# Patient Record
Sex: MALE | Race: NATIVE HAWAIIAN OR OTHER PACIFIC ISLANDER | Employment: OTHER | URBAN - METROPOLITAN AREA
[De-identification: names, ages, dates, MRNs, and addresses within clinical notes are randomized per-mention and may not be internally consistent; named-entity substitution may affect disease eponyms.]

---

## 2017-05-02 ENCOUNTER — OFFICE VISIT (OUTPATIENT)
Dept: FAMILY MEDICINE CLINIC | Age: 42
End: 2017-05-02

## 2017-05-02 VITALS
OXYGEN SATURATION: 99 % | DIASTOLIC BLOOD PRESSURE: 82 MMHG | HEART RATE: 96 BPM | SYSTOLIC BLOOD PRESSURE: 130 MMHG | BODY MASS INDEX: 29.1 KG/M2 | WEIGHT: 216 LBS

## 2017-05-02 DIAGNOSIS — I10 ESSENTIAL HYPERTENSION: Primary | ICD-10-CM

## 2017-05-02 DIAGNOSIS — E78.5 HYPERLIPIDEMIA, UNSPECIFIED HYPERLIPIDEMIA TYPE: ICD-10-CM

## 2017-05-02 DIAGNOSIS — R06.83 SNORING: ICD-10-CM

## 2017-05-02 PROCEDURE — 99213 OFFICE O/P EST LOW 20 MIN: CPT | Performed by: FAMILY MEDICINE

## 2017-05-02 RX ORDER — NABUMETONE 750 MG/1
1 TABLET, FILM COATED ORAL EVERY 12 HOURS
Refills: 5 | COMMUNITY
Start: 2017-04-15 | End: 2019-03-01

## 2017-05-02 RX ORDER — LISINOPRIL 5 MG/1
5 TABLET ORAL DAILY
Qty: 90 TABLET | Refills: 1 | Status: SHIPPED | OUTPATIENT
Start: 2017-05-02 | End: 2017-11-06 | Stop reason: SDUPTHER

## 2017-05-02 ASSESSMENT — ENCOUNTER SYMPTOMS
GASTROINTESTINAL NEGATIVE: 1
RESPIRATORY NEGATIVE: 1

## 2017-06-01 ENCOUNTER — TELEPHONE (OUTPATIENT)
Dept: FAMILY MEDICINE CLINIC | Age: 42
End: 2017-06-01

## 2017-06-07 LAB
ALBUMIN SERPL-MCNC: 4.3 G/DL
ALP BLD-CCNC: 75 U/L
ALT SERPL-CCNC: 24 U/L
ANION GAP SERPL CALCULATED.3IONS-SCNC: NORMAL MMOL/L
AST SERPL-CCNC: 15 U/L
BASOPHILS ABSOLUTE: NORMAL /ΜL
BASOPHILS RELATIVE PERCENT: NORMAL %
BILIRUB SERPL-MCNC: 0.4 MG/DL (ref 0.1–1.4)
BUN BLDV-MCNC: 12 MG/DL
CALCIUM SERPL-MCNC: 3.2 MG/DL
CHLORIDE BLD-SCNC: 101 MMOL/L
CHOLESTEROL, TOTAL: 210 MG/DL
CHOLESTEROL/HDL RATIO: 6.6
CO2: NORMAL MMOL/L
CREAT SERPL-MCNC: 1.07 MG/DL
EOSINOPHILS ABSOLUTE: NORMAL /ΜL
EOSINOPHILS RELATIVE PERCENT: NORMAL %
GFR CALCULATED: NORMAL
GLUCOSE BLD-MCNC: 95 MG/DL
HCT VFR BLD CALC: 45.83 % (ref 41–53)
HDLC SERPL-MCNC: 32 MG/DL (ref 35–70)
HEMOGLOBIN: 14.7 G/DL (ref 13.5–17.5)
LDL CHOLESTEROL CALCULATED: 140 MG/DL (ref 0–160)
LYMPHOCYTES ABSOLUTE: NORMAL /ΜL
LYMPHOCYTES RELATIVE PERCENT: NORMAL %
MCH RBC QN AUTO: NORMAL PG
MCHC RBC AUTO-ENTMCNC: NORMAL G/DL
MCV RBC AUTO: NORMAL FL
MONOCYTES ABSOLUTE: NORMAL /ΜL
MONOCYTES RELATIVE PERCENT: NORMAL %
NEUTROPHILS ABSOLUTE: NORMAL /ΜL
NEUTROPHILS RELATIVE PERCENT: NORMAL %
PDW BLD-RTO: NORMAL %
PLATELET # BLD: 228 K/ΜL
PMV BLD AUTO: NORMAL FL
POTASSIUM SERPL-SCNC: 4.2 MMOL/L
RBC # BLD: 6.56 10^6/ΜL
SODIUM BLD-SCNC: 140 MMOL/L
TOTAL PROTEIN: 7.1
TRIGL SERPL-MCNC: 189 MG/DL
VLDLC SERPL CALC-MCNC: 38 MG/DL
WBC # BLD: 6.8 10^3/ML

## 2017-06-19 ENCOUNTER — TELEPHONE (OUTPATIENT)
Dept: SLEEP MEDICINE | Age: 42
End: 2017-06-19

## 2017-07-24 ENCOUNTER — TELEPHONE (OUTPATIENT)
Dept: FAMILY MEDICINE CLINIC | Age: 42
End: 2017-07-24

## 2017-11-06 ENCOUNTER — OFFICE VISIT (OUTPATIENT)
Dept: FAMILY MEDICINE CLINIC | Age: 42
End: 2017-11-06

## 2017-11-06 VITALS
BODY MASS INDEX: 29.61 KG/M2 | DIASTOLIC BLOOD PRESSURE: 88 MMHG | WEIGHT: 219.8 LBS | OXYGEN SATURATION: 98 % | HEART RATE: 76 BPM | SYSTOLIC BLOOD PRESSURE: 132 MMHG

## 2017-11-06 DIAGNOSIS — R06.83 SNORING: ICD-10-CM

## 2017-11-06 DIAGNOSIS — I10 ESSENTIAL HYPERTENSION: Primary | ICD-10-CM

## 2017-11-06 PROCEDURE — 1036F TOBACCO NON-USER: CPT | Performed by: FAMILY MEDICINE

## 2017-11-06 PROCEDURE — G8419 CALC BMI OUT NRM PARAM NOF/U: HCPCS | Performed by: FAMILY MEDICINE

## 2017-11-06 PROCEDURE — 99213 OFFICE O/P EST LOW 20 MIN: CPT | Performed by: FAMILY MEDICINE

## 2017-11-06 PROCEDURE — G8484 FLU IMMUNIZE NO ADMIN: HCPCS | Performed by: FAMILY MEDICINE

## 2017-11-06 PROCEDURE — G8427 DOCREV CUR MEDS BY ELIG CLIN: HCPCS | Performed by: FAMILY MEDICINE

## 2017-11-06 RX ORDER — BLOOD PRESSURE TEST KIT
KIT MISCELLANEOUS
Qty: 1 KIT | Refills: 0 | Status: SHIPPED | OUTPATIENT
Start: 2017-11-06

## 2017-11-06 RX ORDER — LISINOPRIL 5 MG/1
5 TABLET ORAL DAILY
Qty: 90 TABLET | Refills: 2 | Status: SHIPPED | OUTPATIENT
Start: 2017-11-06 | End: 2018-05-07

## 2017-11-06 NOTE — PROGRESS NOTES
History   Smoking Status    Never Smoker   Smokeless Tobacco    Never Used     PMH, surgeries, SH, FH, allergies reviewed. Medication list reviewed and updated. Chief Complaint   Patient presents with    Hypertension     Hypertension:  Denies chest pain, SOB, cough, visual changes, dizziness, palpitations or headache. He is adherent to a low sodium diet. He is  adherent to an exercise program.  He is  compliant with medications. Blood pressure typically runs unknown outside of the office. Loud snoring: Yes  Witnessed apnea episodes: unsure. Review of Systems   Constitutional: Negative for chills, fever, malaise/fatigue and weight loss. HENT: Negative for sore throat. Respiratory: Negative for cough, shortness of breath and wheezing. Cardiovascular: Negative for chest pain, palpitations, orthopnea and leg swelling. Gastrointestinal: Negative for abdominal pain, constipation, diarrhea, heartburn, nausea and vomiting. Genitourinary: Negative for dysuria. Musculoskeletal: Negative for myalgias. Skin: Negative for rash. Neurological: Negative for dizziness, weakness and headaches. Psychiatric/Behavioral: The patient is not nervous/anxious and does not have insomnia. Objective:   VS reviewed    Vitals:    11/06/17 1052 11/06/17 1055 11/06/17 1118   BP: (!) 152/104 (!) 152/102 132/88   Site: Left Arm Left Arm    Position: Sitting Sitting    Cuff Size: Medium Adult Large Adult    Pulse: 76     SpO2: 98%     Weight: 219 lb 12.8 oz (99.7 kg)       Body mass index is 29.61 kg/m². Wt Readings from Last 3 Encounters:   11/06/17 219 lb 12.8 oz (99.7 kg)   05/02/17 216 lb (98 kg)   11/01/16 214 lb 3.2 oz (97.2 kg)     BP Readings from Last 3 Encounters:   11/06/17 132/88   05/02/17 130/82   11/01/16 112/70       Physical Exam   Constitutional: He is oriented to person, place, and time. No distress. Neck: No JVD present. No thyromegaly present.    Cardiovascular: Normal rate, regular rhythm, normal heart sounds and intact distal pulses. No murmur heard. Pulmonary/Chest: Effort normal and breath sounds normal. No respiratory distress. Musculoskeletal: He exhibits no edema. Neurological: He is alert and oriented to person, place, and time. Psychiatric: Mood, memory, affect and judgment normal.       Lab Results   Component Value Date     06/07/2017    K 4.2 06/07/2017     06/07/2017    CO2 26 10/28/2016    BUN 12 06/07/2017    CREATININE 1.07 06/07/2017    GLUCOSE 95 06/07/2017    CALCIUM 3.2 06/07/2017    PROT 6.9 10/28/2016    LABALBU 4.3 06/07/2017    BILITOT 0.4 06/07/2017    ALKPHOS 75 06/07/2017    AST 15 06/07/2017    ALT 24 06/07/2017    LABGLOM >60 10/28/2016    GFRAA >60 10/28/2016    AGRATIO 1.9 10/28/2016    GLOB 2.4 10/28/2016         Lab Results   Component Value Date    WBC 6.8 06/07/2017    HGB 14.7 06/07/2017    HCT 45.83 06/07/2017    MCV 71.6 (L) 10/28/2016     06/07/2017       Lab Results   Component Value Date    CHOL 210 06/06/2017    CHOL 216 (H) 10/28/2016     Lab Results   Component Value Date    TRIG 189 06/06/2017    TRIG 177 (H) 10/28/2016     Lab Results   Component Value Date    HDL 32 (A) 06/06/2017    HDL 33 (L) 10/28/2016     Lab Results   Component Value Date    LDLCALC 140 06/06/2017    LDLCALC 148 (H) 10/28/2016     Lab Results   Component Value Date    LABVLDL 35 10/28/2016    VLDL 38 06/06/2017     Lab Results   Component Value Date    CHOLHDLRATIO 6.6 06/06/2017       Lab Results   Component Value Date    LABMICR <1.20 10/28/2016       No results found for: LABA1C  No results found for: EAG      Assessment/Plan:    1. Essential hypertension  Well controlled HTN on lisinopril 5 mg daily. Lipids are acceptable no meds. Kidney function and screening for nephropathy are normal/negative. 2. Snoring  - Ambulatory referral to Sleep Medicine    Return in about 6 months (around 5/6/2018) for HTN .     Scott received counseling on the

## 2017-11-11 ASSESSMENT — ENCOUNTER SYMPTOMS
DIARRHEA: 0
WHEEZING: 0
COUGH: 0
SORE THROAT: 0
HEARTBURN: 0
ABDOMINAL PAIN: 0
ORTHOPNEA: 0
CONSTIPATION: 0
SHORTNESS OF BREATH: 0
NAUSEA: 0
VOMITING: 0

## 2018-03-16 ENCOUNTER — OFFICE VISIT (OUTPATIENT)
Dept: SLEEP MEDICINE | Age: 43
End: 2018-03-16

## 2018-03-16 VITALS
HEART RATE: 83 BPM | DIASTOLIC BLOOD PRESSURE: 90 MMHG | WEIGHT: 214 LBS | SYSTOLIC BLOOD PRESSURE: 138 MMHG | OXYGEN SATURATION: 98 % | BODY MASS INDEX: 28.99 KG/M2 | HEIGHT: 72 IN

## 2018-03-16 DIAGNOSIS — I10 ESSENTIAL HYPERTENSION: Chronic | ICD-10-CM

## 2018-03-16 DIAGNOSIS — R06.83 SNORING: ICD-10-CM

## 2018-03-16 DIAGNOSIS — G47.10 HYPERSOMNIA: Primary | ICD-10-CM

## 2018-03-16 PROCEDURE — 99244 OFF/OP CNSLTJ NEW/EST MOD 40: CPT | Performed by: INTERNAL MEDICINE

## 2018-03-16 PROCEDURE — G8419 CALC BMI OUT NRM PARAM NOF/U: HCPCS | Performed by: INTERNAL MEDICINE

## 2018-03-16 PROCEDURE — G8484 FLU IMMUNIZE NO ADMIN: HCPCS | Performed by: INTERNAL MEDICINE

## 2018-03-16 PROCEDURE — G8427 DOCREV CUR MEDS BY ELIG CLIN: HCPCS | Performed by: INTERNAL MEDICINE

## 2018-03-16 ASSESSMENT — SLEEP AND FATIGUE QUESTIONNAIRES
HOW LIKELY ARE YOU TO NOD OFF OR FALL ASLEEP WHILE SITTING INACTIVE IN A PUBLIC PLACE: 1
HOW LIKELY ARE YOU TO NOD OFF OR FALL ASLEEP WHILE SITTING AND TALKING TO SOMEONE: 0
HOW LIKELY ARE YOU TO NOD OFF OR FALL ASLEEP WHILE WATCHING TV: 2
NECK CIRCUMFERENCE (INCHES): 17.5
HOW LIKELY ARE YOU TO NOD OFF OR FALL ASLEEP WHILE SITTING AND READING: 2
HOW LIKELY ARE YOU TO NOD OFF OR FALL ASLEEP IN A CAR, WHILE STOPPED FOR A FEW MINUTES IN TRAFFIC: 1
HOW LIKELY ARE YOU TO NOD OFF OR FALL ASLEEP WHEN YOU ARE A PASSENGER IN A CAR FOR AN HOUR WITHOUT A BREAK: 1
HOW LIKELY ARE YOU TO NOD OFF OR FALL ASLEEP WHILE SITTING QUIETLY AFTER LUNCH WITHOUT ALCOHOL: 2
HOW LIKELY ARE YOU TO NOD OFF OR FALL ASLEEP WHILE LYING DOWN TO REST IN THE AFTERNOON WHEN CIRCUMSTANCES PERMIT: 1
ESS TOTAL SCORE: 10

## 2018-03-16 ASSESSMENT — ENCOUNTER SYMPTOMS
ABDOMINAL PAIN: 0
SHORTNESS OF BREATH: 0
APNEA: 1
CHOKING: 0
CHEST TIGHTNESS: 0
PHOTOPHOBIA: 0
EYE PAIN: 0
ABDOMINAL DISTENTION: 0
NAUSEA: 0
ALLERGIC/IMMUNOLOGIC NEGATIVE: 1
VOMITING: 0
RHINORRHEA: 0

## 2018-03-16 NOTE — PROGRESS NOTES
Esther Jon MD, [facility] HEART AND SURGICAL [facility]  [address]  3rd Floor, [address] [facility], [address] [phone]   [address] [place] [address] [address] Kenny Kendrick. Leroy Sawyer 37 [phone]     [address] [place] [address]    Subjective:     Patient ID: Maya Galindo is a 40 y.o. male. Chief Complaint   Patient presents with    Snoring    Fatigue       HPI:  Visit done with       Maya Galindo is a 40 y.o. male referred by Lucian Skinner MD for a sleep evaluation. He complains of snoring, snorting, periods of not breathing, tossing and turning, excessive daytime sleepiness, feels sleepy during the day but he denies knees buckling with laughing, completely or partially paralyzed while falling asleep or waking up. Symptoms began 6 years ago, gradually worsening since that time. Previous evaluation and treatment has included- none. DOT/CDL - No  FAA/'s license - No      Previous Report(s) Reviewed: historical medical records, office notes and referral letter/letters     [place] - Total score: 10    Caffeine Intake - None    Social History     Social History    Marital status:      Spouse name: N/A    Number of children: N/A    Years of education: N/A     Occupational History    Not on file. Social History Main Topics    Smoking status: Never Smoker    Smokeless tobacco: Never Used    Alcohol use No    Drug use: No    Sexual activity: Yes     Partners: Female     Other Topics Concern    Not on file     Social History Narrative    No narrative on file       Prior to Admission medications    Medication Sig Start Date End Date Taking?  Authorizing Provider   lisinopril (PRINIVIL;ZESTRIL) 5 MG tablet Take 1 tablet by mouth daily 11/6/17  Yes Lucian Skinner MD   Blood Pressure KIT Check blood pressure twice daily 11/6/17  Yes Lucian Skinner MD   nabumetone (RELAFEN)

## 2018-03-29 ENCOUNTER — HOSPITAL ENCOUNTER (OUTPATIENT)
Dept: SLEEP MEDICINE | Age: 43
Discharge: HOME | End: 2018-03-30
Attending: INTERNAL MEDICINE | Admitting: INTERNAL MEDICINE

## 2018-03-29 DIAGNOSIS — G47.10 HYPERSOMNIA: ICD-10-CM

## 2018-03-29 DIAGNOSIS — R06.83 SNORING: ICD-10-CM

## 2018-03-29 PROCEDURE — 95806 SLEEP STUDY UNATT&RESP EFFT: CPT | Performed by: INTERNAL MEDICINE

## 2018-04-04 ENCOUNTER — TELEPHONE (OUTPATIENT)
Dept: SLEEP MEDICINE | Age: 43
End: 2018-04-04

## 2018-05-07 ENCOUNTER — OFFICE VISIT (OUTPATIENT)
Dept: FAMILY MEDICINE CLINIC | Age: 43
End: 2018-05-07

## 2018-05-07 VITALS
HEART RATE: 71 BPM | OXYGEN SATURATION: 98 % | WEIGHT: 214.6 LBS | BODY MASS INDEX: 29.1 KG/M2 | DIASTOLIC BLOOD PRESSURE: 96 MMHG | SYSTOLIC BLOOD PRESSURE: 138 MMHG

## 2018-05-07 DIAGNOSIS — G47.33 OBSTRUCTIVE SLEEP APNEA SYNDROME: ICD-10-CM

## 2018-05-07 DIAGNOSIS — I10 ESSENTIAL HYPERTENSION: Primary | ICD-10-CM

## 2018-05-07 DIAGNOSIS — E78.49 OTHER HYPERLIPIDEMIA: ICD-10-CM

## 2018-05-07 PROCEDURE — 1036F TOBACCO NON-USER: CPT | Performed by: FAMILY MEDICINE

## 2018-05-07 PROCEDURE — 99213 OFFICE O/P EST LOW 20 MIN: CPT | Performed by: FAMILY MEDICINE

## 2018-05-07 PROCEDURE — G8419 CALC BMI OUT NRM PARAM NOF/U: HCPCS | Performed by: FAMILY MEDICINE

## 2018-05-07 PROCEDURE — G8427 DOCREV CUR MEDS BY ELIG CLIN: HCPCS | Performed by: FAMILY MEDICINE

## 2018-05-07 RX ORDER — ASPIRIN 325 MG
325 TABLET ORAL DAILY
COMMUNITY

## 2018-05-07 RX ORDER — LISINOPRIL 10 MG/1
10 TABLET ORAL DAILY
Qty: 90 TABLET | Refills: 0 | Status: SHIPPED | OUTPATIENT
Start: 2018-05-07 | End: 2018-06-08

## 2018-05-07 ASSESSMENT — ENCOUNTER SYMPTOMS
RESPIRATORY NEGATIVE: 1
GASTROINTESTINAL NEGATIVE: 1

## 2018-05-15 ENCOUNTER — HOSPITAL ENCOUNTER (OUTPATIENT)
Dept: GENERAL RADIOLOGY | Age: 43
Discharge: HOME | End: 2018-05-15
Attending: FAMILY MEDICINE | Admitting: FAMILY MEDICINE

## 2018-05-15 DIAGNOSIS — I10 ESSENTIAL HYPERTENSION: ICD-10-CM

## 2018-05-15 DIAGNOSIS — G47.33 OBSTRUCTIVE SLEEP APNEA SYNDROME: ICD-10-CM

## 2018-05-15 DIAGNOSIS — E78.49 OTHER HYPERLIPIDEMIA: ICD-10-CM

## 2018-05-15 LAB
A/G RATIO: 1.8 (ref 1.1–2.2)
ALBUMIN SERPL-MCNC: 4.6 G/DL (ref 3.4–5)
ALP BLD-CCNC: 77 U/L (ref 40–129)
ALT SERPL-CCNC: 29 U/L (ref 10–40)
ANION GAP SERPL CALCULATED.3IONS-SCNC: 16 MMOL/L (ref 3–16)
AST SERPL-CCNC: 15 U/L (ref 15–37)
BASOPHILS ABSOLUTE: 0 K/UL (ref 0–0.2)
BASOPHILS RELATIVE PERCENT: 0.3 %
BILIRUB SERPL-MCNC: 0.3 MG/DL (ref 0–1)
BUN BLDV-MCNC: 11 MG/DL (ref 7–20)
CALCIUM SERPL-MCNC: 9.5 MG/DL (ref 8.3–10.6)
CHLORIDE BLD-SCNC: 99 MMOL/L (ref 99–110)
CHOLESTEROL, TOTAL: 228 MG/DL (ref 0–199)
CO2: 27 MMOL/L (ref 21–32)
CREAT SERPL-MCNC: 1.1 MG/DL (ref 0.9–1.3)
CREATININE URINE: 307.7 MG/DL (ref 39–259)
EOSINOPHILS ABSOLUTE: 0.1 K/UL (ref 0–0.6)
EOSINOPHILS RELATIVE PERCENT: 1.1 %
GFR AFRICAN AMERICAN: >60
GFR NON-AFRICAN AMERICAN: >60
GLOBULIN: 2.5 G/DL
GLUCOSE BLD-MCNC: 94 MG/DL (ref 70–99)
HCT VFR BLD CALC: 45.7 % (ref 40.5–52.5)
HDLC SERPL-MCNC: 30 MG/DL (ref 40–60)
HEMOGLOBIN: 14.9 G/DL (ref 13.5–17.5)
LDL CHOLESTEROL CALCULATED: ABNORMAL MG/DL
LDL CHOLESTEROL DIRECT: 163 MG/DL
LYMPHOCYTES ABSOLUTE: 2 K/UL (ref 1–5.1)
LYMPHOCYTES RELATIVE PERCENT: 24.5 %
MCH RBC QN AUTO: 23.3 PG (ref 26–34)
MCHC RBC AUTO-ENTMCNC: 32.7 G/DL (ref 31–36)
MCV RBC AUTO: 71.2 FL (ref 80–100)
MICROALBUMIN UR-MCNC: <1.2 MG/DL
MICROALBUMIN/CREAT UR-RTO: ABNORMAL MG/G (ref 0–30)
MONOCYTES ABSOLUTE: 0.7 K/UL (ref 0–1.3)
MONOCYTES RELATIVE PERCENT: 8.4 %
NEUTROPHILS ABSOLUTE: 5.3 K/UL (ref 1.7–7.7)
NEUTROPHILS RELATIVE PERCENT: 65.7 %
PDW BLD-RTO: 14.7 % (ref 12.4–15.4)
PLATELET # BLD: 201 K/UL (ref 135–450)
PMV BLD AUTO: 9.5 FL (ref 5–10.5)
POTASSIUM SERPL-SCNC: 4.3 MMOL/L (ref 3.5–5.1)
RBC # BLD: 6.41 M/UL (ref 4.2–5.9)
SODIUM BLD-SCNC: 142 MMOL/L (ref 136–145)
TOTAL PROTEIN: 7.1 G/DL (ref 6.4–8.2)
TRIGL SERPL-MCNC: 318 MG/DL (ref 0–150)
TSH REFLEX: 1.14 UIU/ML (ref 0.27–4.2)
VLDLC SERPL CALC-MCNC: ABNORMAL MG/DL
WBC # BLD: 8.1 K/UL (ref 4–11)

## 2018-05-16 LAB
ESTIMATED AVERAGE GLUCOSE: 114 MG/DL
HBA1C MFR BLD: 5.6 %

## 2018-05-31 ENCOUNTER — HOSPITAL ENCOUNTER (OUTPATIENT)
Dept: OTHER | Age: 43
Discharge: HOME | End: 2018-05-31
Attending: FAMILY MEDICINE | Admitting: FAMILY MEDICINE

## 2018-05-31 DIAGNOSIS — I10 ESSENTIAL HYPERTENSION: ICD-10-CM

## 2018-05-31 LAB
EKG ATRIAL RATE: 76 BPM
EKG DIAGNOSIS: NORMAL
EKG P AXIS: 18 DEGREES
EKG P-R INTERVAL: 142 MS
EKG Q-T INTERVAL: 340 MS
EKG QRS DURATION: 98 MS
EKG QTC CALCULATION (BAZETT): 382 MS
EKG R AXIS: 48 DEGREES
EKG T AXIS: 42 DEGREES
EKG VENTRICULAR RATE: 76 BPM

## 2018-05-31 PROCEDURE — 93010 ELECTROCARDIOGRAM REPORT: CPT | Performed by: INTERNAL MEDICINE

## 2018-06-08 ENCOUNTER — OFFICE VISIT (OUTPATIENT)
Dept: FAMILY MEDICINE CLINIC | Age: 43
End: 2018-06-08

## 2018-06-08 VITALS
WEIGHT: 214 LBS | SYSTOLIC BLOOD PRESSURE: 150 MMHG | HEART RATE: 76 BPM | DIASTOLIC BLOOD PRESSURE: 100 MMHG | BODY MASS INDEX: 29.02 KG/M2 | OXYGEN SATURATION: 99 %

## 2018-06-08 DIAGNOSIS — I10 ESSENTIAL HYPERTENSION: Primary | ICD-10-CM

## 2018-06-08 DIAGNOSIS — E78.49 OTHER HYPERLIPIDEMIA: ICD-10-CM

## 2018-06-08 PROCEDURE — G8427 DOCREV CUR MEDS BY ELIG CLIN: HCPCS | Performed by: FAMILY MEDICINE

## 2018-06-08 PROCEDURE — G8419 CALC BMI OUT NRM PARAM NOF/U: HCPCS | Performed by: FAMILY MEDICINE

## 2018-06-08 PROCEDURE — 1036F TOBACCO NON-USER: CPT | Performed by: FAMILY MEDICINE

## 2018-06-08 PROCEDURE — 99213 OFFICE O/P EST LOW 20 MIN: CPT | Performed by: FAMILY MEDICINE

## 2018-06-08 RX ORDER — METOPROLOL SUCCINATE 50 MG/1
50 TABLET, EXTENDED RELEASE ORAL DAILY
Qty: 30 TABLET | Refills: 2 | Status: SHIPPED | OUTPATIENT
Start: 2018-06-08 | End: 2019-01-30

## 2018-06-08 ASSESSMENT — PATIENT HEALTH QUESTIONNAIRE - PHQ9
SUM OF ALL RESPONSES TO PHQ9 QUESTIONS 1 & 2: 0
1. LITTLE INTEREST OR PLEASURE IN DOING THINGS: 0
2. FEELING DOWN, DEPRESSED OR HOPELESS: 0
SUM OF ALL RESPONSES TO PHQ QUESTIONS 1-9: 0

## 2018-07-18 ENCOUNTER — OFFICE VISIT (OUTPATIENT)
Dept: PULMONOLOGY | Age: 43
End: 2018-07-18

## 2018-07-18 VITALS
OXYGEN SATURATION: 99 % | SYSTOLIC BLOOD PRESSURE: 142 MMHG | BODY MASS INDEX: 27.35 KG/M2 | WEIGHT: 220 LBS | HEIGHT: 75 IN | HEART RATE: 90 BPM | DIASTOLIC BLOOD PRESSURE: 92 MMHG

## 2018-07-18 DIAGNOSIS — G47.33 OBSTRUCTIVE SLEEP APNEA SYNDROME: ICD-10-CM

## 2018-07-18 DIAGNOSIS — I10 ESSENTIAL HYPERTENSION: ICD-10-CM

## 2018-07-18 PROCEDURE — 99213 OFFICE O/P EST LOW 20 MIN: CPT | Performed by: INTERNAL MEDICINE

## 2018-07-18 PROCEDURE — G8427 DOCREV CUR MEDS BY ELIG CLIN: HCPCS | Performed by: INTERNAL MEDICINE

## 2018-07-18 PROCEDURE — 1036F TOBACCO NON-USER: CPT | Performed by: INTERNAL MEDICINE

## 2018-07-18 PROCEDURE — G8419 CALC BMI OUT NRM PARAM NOF/U: HCPCS | Performed by: INTERNAL MEDICINE

## 2018-07-18 ASSESSMENT — SLEEP AND FATIGUE QUESTIONNAIRES
HOW LIKELY ARE YOU TO NOD OFF OR FALL ASLEEP WHILE SITTING INACTIVE IN A PUBLIC PLACE: 1
HOW LIKELY ARE YOU TO NOD OFF OR FALL ASLEEP WHILE SITTING AND TALKING TO SOMEONE: 0
HOW LIKELY ARE YOU TO NOD OFF OR FALL ASLEEP WHILE WATCHING TV: 0
HOW LIKELY ARE YOU TO NOD OFF OR FALL ASLEEP WHEN YOU ARE A PASSENGER IN A CAR FOR AN HOUR WITHOUT A BREAK: 2
HOW LIKELY ARE YOU TO NOD OFF OR FALL ASLEEP WHILE LYING DOWN TO REST IN THE AFTERNOON WHEN CIRCUMSTANCES PERMIT: 3
HOW LIKELY ARE YOU TO NOD OFF OR FALL ASLEEP WHILE SITTING AND READING: 1
HOW LIKELY ARE YOU TO NOD OFF OR FALL ASLEEP WHILE SITTING QUIETLY AFTER LUNCH WITHOUT ALCOHOL: 2
ESS TOTAL SCORE: 9
HOW LIKELY ARE YOU TO NOD OFF OR FALL ASLEEP IN A CAR, WHILE STOPPED FOR A FEW MINUTES IN TRAFFIC: 0

## 2018-07-18 ASSESSMENT — ENCOUNTER SYMPTOMS
EYE PAIN: 0
SINUS PRESSURE: 0
STRIDOR: 0
SHORTNESS OF BREATH: 0
CHEST TIGHTNESS: 0
PHOTOPHOBIA: 0

## 2018-07-18 NOTE — LETTER
[facility]  [address]   [facility]  [address]  [place] [address]  Phone: [phone]  Fax: [phone]    July 18, 2018       Patient: Horacio Newsome   MR Number: [id number]   YOB: 1975   Date of Visit: 7/18/2018       Horacio Newsome was seen for a follow up visit today. Here is my assessment and plan as well as an attached copy of his visit today:    Obstructive sleep apnea syndrome  New Problem - On Tx. Reviewed sleep study (copy given to pt for their records) and download compliance data with patient. Supplies and parts as needed for his machine. These are medically necessary. Limit caffeine use after 3pm.  Will show how to adjust humidity. 3 month f/u. If aerophagia is not better may need bilevel. Essential hypertension  Chronic- Stable. Cont meds per PCP and other physicians. If you have questions or concerns, please do not hesitate to call me. I look forward to following Scott along with you.     Sincerely,    Vianney Sheikh MD    CC providers:  Seth Clay MD  [address] [facility]

## 2018-07-18 NOTE — PROGRESS NOTES
Blood Pressure KIT Check blood pressure twice daily 1 kit 0    nabumetone (RELAFEN) 750 MG tablet Take 1 tablet by mouth every 12 hours  5    Tapentadol HCl (NUCYNTA) 75 MG TABS Take 1 tablet by mouth daily as needed      pregabalin (LYRICA) 75 MG capsule Take 75 mg by mouth nightly      diclofenac sodium (VOLTAREN) 1 % GEL Apply 2 g topically nightly       No current facility-administered medications for this visit. Allergies as of 07/18/2018    (No Known Allergies)       Patient Active Problem List   Diagnosis    Essential hypertension    Hyperlipidemia    Snoring    Obstructive sleep apnea syndrome       Past Medical History:   Diagnosis Date    Lumbar disc disorder     With chronic pain managed by pain specialist    Obstructive sleep apnea syndrome        History reviewed. No pertinent surgical history. Family History   Problem Relation Age of Onset    Diabetes Mother     Sleep Apnea Father        Review of Systems   Constitutional: Negative. HENT: Negative for dental problem, ear pain, nosebleeds, sinus pressure and sneezing. Eyes: Negative for photophobia, pain and visual disturbance. Respiratory: Negative for chest tightness, shortness of breath and stridor. Cardiovascular: Negative for chest pain, palpitations and leg swelling. Musculoskeletal: Negative for neck pain and neck stiffness. Vitals:  Weight BMI   Wt Readings from Last 3 Encounters:   07/18/18 220 lb (99.8 kg)   06/08/18 214 lb (97.1 kg)   05/07/18 214 lb 9.6 oz (97.3 kg)    Body mass index is 27.5 kg/m². BP HR SaO2   BP Readings from Last 3 Encounters:   07/18/18 (!) 142/92   06/08/18 (!) 150/100   05/07/18 (!) 138/96    Pulse Readings from Last 3 Encounters:   07/18/18 90   06/08/18 76   05/07/18 71    SpO2 Readings from Last 3 Encounters:   07/18/18 99%   06/08/18 99%   05/07/18 98%          Assessment/Plan:     Obstructive sleep apnea syndrome  New Problem - On Tx.   Reviewed sleep study (copy given to pt for their records) and download compliance data with patient. Supplies and parts as needed for his machine. These are medically necessary. Limit caffeine use after 3pm.  Will show how to adjust humidity. 3 month f/u. If aerophagia is not better may need bilevel. Essential hypertension  Chronic- Stable. Cont meds per PCP and other physicians. Diagnoses of Obstructive sleep apnea syndrome and Essential hypertension were pertinent to this visit. The chronic medical conditions listed are directly related to the primary diagnosis listed above. The management of the primary diagnosis affects the secondary diagnosis and vice versa.       Jocelyne Rothman MD, Lilia Velázquez, [facility]  Medical Director  [place] and [address] [place] [address]

## 2018-07-18 NOTE — ASSESSMENT & PLAN NOTE
New Problem - On Tx. Reviewed sleep study (copy given to pt for their records) and download compliance data with patient. Supplies and parts as needed for his machine. These are medically necessary. Limit caffeine use after 3pm.  Will show how to adjust humidity. 3 month f/u. If aerophagia is not better may need bilevel.

## 2018-07-23 ENCOUNTER — OFFICE VISIT (OUTPATIENT)
Dept: FAMILY MEDICINE CLINIC | Age: 43
End: 2018-07-23

## 2018-07-23 VITALS
DIASTOLIC BLOOD PRESSURE: 98 MMHG | SYSTOLIC BLOOD PRESSURE: 130 MMHG | WEIGHT: 218.8 LBS | BODY MASS INDEX: 27.35 KG/M2 | OXYGEN SATURATION: 98 % | HEART RATE: 84 BPM

## 2018-07-23 DIAGNOSIS — I10 ESSENTIAL HYPERTENSION: Primary | ICD-10-CM

## 2018-07-23 PROCEDURE — 99214 OFFICE O/P EST MOD 30 MIN: CPT | Performed by: FAMILY MEDICINE

## 2018-07-23 PROCEDURE — 1036F TOBACCO NON-USER: CPT | Performed by: FAMILY MEDICINE

## 2018-07-23 PROCEDURE — 93000 ELECTROCARDIOGRAM COMPLETE: CPT | Performed by: FAMILY MEDICINE

## 2018-07-23 PROCEDURE — G8419 CALC BMI OUT NRM PARAM NOF/U: HCPCS | Performed by: FAMILY MEDICINE

## 2018-07-23 PROCEDURE — G8427 DOCREV CUR MEDS BY ELIG CLIN: HCPCS | Performed by: FAMILY MEDICINE

## 2018-07-23 RX ORDER — METOPROLOL SUCCINATE 100 MG/1
100 TABLET, EXTENDED RELEASE ORAL DAILY
Qty: 30 TABLET | Refills: 3 | Status: SHIPPED | OUTPATIENT
Start: 2018-07-23 | End: 2018-08-20 | Stop reason: SDUPTHER

## 2018-07-23 NOTE — PATIENT INSTRUCTIONS
citrato de sodio y el bicarbonato de sodio. La comida asiática frecuentemente contiene MSG añadido. Si sale a comer, a veces puede pedir que no le pongan MSG ni sal a kenan alimentos. Compre alimentos bajos en sodio  · Compre alimentos que indiquen en la etiqueta \"sin sal\" (\"unsalted\") (sin sal añadida), \"sin sodio\" (\"sodium-free\") (menos de 5 mg de sodio por porción) o \"bajo en sodio\" (\"low-sodium\") (menos de 140 mg de sodio por porción). Los alimentos que indiquen en la etiqueta \"reducido en sodio\" (\"reduced-sodium\") y \"ligero contenido de sodio\" (\"light sodium\") aún pueden contener demasiado sodio. Asegúrese de leer la etiqueta para verificar cuánto sodio está consumiendo. · Compre verduras frescas o congeladas que no tengan salsas [id number]. Compre las versiones de bajo sodio de verduras Kirkjubæjuanrkimberlyausdoraur, sopas y otros productos enlatados. Prepare comidas bajas en sodio  · Reduzca la cantidad de sal que [place] para cocinar. Mason le ayudará a acostumbrarse al [employer]. No añada richard después de eli cocinado. Griselda cucharadita de sal contiene alrededor de 2,300 mg de sodio. · Retire el salero de la obando. · Sazone kenan comidas con ajo, jugo de rashaad, cebolla, vinagre, hierbas y especias. No use salsa de soya, salsa de soya \"lite\", salsa para kevin, sal de cebolla, sal de ajo o de apio, mostaza ni ketchup (salsa de tomate) en kenan comidas. · Use aderezos para ensaladas, salsas y ketchup de bajo contenido de [place]. O prepare kenan propios aderezos para ensaladas y salsas sin añadir sal.  · Use menos sal (o nada) cuando la receta lo pida. Por lo general puede añadir la mitad de la cantidad de richard que pide la receta sin que esta pierda el sabor. Otros alimentos perri el arroz, las pastas y los cereales no necesitan sal adicional.  · Enjuague las verduras enlatadas y cocínelas en agua fresca. Mason le delmis algo de sal, yan no toda.   · Evite el agua que naturalmente tenga un alto contenido de sodio o que haya sido tratada con ablandadores, los cuales [employer]. Llame a beavers compañía de agua local para averiguar cuál es el contenido de sodio del agua. Si compra agua embotellada, marcelo la etiqueta y escoja radha alirio sin sodio. Evite los alimentos altos en sodio  · Evite comer:  ¨ Nicholas, pescados y aves [place], curados, salados y [place]. ¨ Jamón, tocino, perros calientes (\"hot dogs\") y nicholas frías. ¨ Queso común, azalea y procesado y mantequilla de cacahuate (maní) común. ¨ Galletas saladas y otros refrigerios salados perri \"pretzels\", \"chips\" (perri linsey fritas) y palomitas de maíz saladas. ¨ Comidas preparadas y congeladas, a menos que tengan radha etiqueta que diga \"bajo en sodio\" (\"low-sodium\"). ¨ Sopas, caldos y consomés enlatados y secos o deshidratados, a menos que digan \"sin sodio\" (\"sodium-free\") o \"bajo en sodio\" (\"low-sodium\"). ¨ Verduras enlatadas, a menos que digan \"sin sodio\" (\"sodium-free\") o \"bajo en sodio\" (\"low-sodium\"). ¨ Linsey fritas (a la francesa), pizzas, tacos y otras comidas rápidas. ¨ Pepinillos, aceitunas, ketchup y otros condimentos, en especial la salsa de soya, a menos que digan \"sin sodio\" (\"sodium-free\") o \"bajo en sodio\" (\"low-sodium\"). ¿Dónde puede encontrar más información en inglés? Noemi garcia [email or web address] e ingrese a beavers cuenta de MyChart. Katie Mike J439 en el cuadro \"Search Health Information\" para más información (en inglés) sobre \"Dieta baja en sodio (2,000 miligramos): Instrucciones de cuidado - [ Low Sodium Diet (2,000 Milligram): Care Instructions ]. \"     Si no tiene radha cuenta, mick saritha en el enlace \"Sign Up Now\". Revisado: 17 [place], 56  Versión del contenido: 11.6  © 2957-3713 [employer], Play[employer]. Las instrucciones de cuidado fueron adaptadas bajo licencia por [facility] ([facility]). Si usted tiene [place] afección médica o sobre estas instrucciones, siempre pregunte a beavers profesional de sixto.  Arnie Becerril seguimiento es radha parte clave de beavers tratamiento y seguridad. Asegúrese de hacer y acudir a todas las citas, y llame a beavers médico si está teniendo problemas. También es radha buena idea saber los resultados de kenan exámenes y mantener radha lista de los medicamentos que gary. ¿Dónde puede encontrar más información en inglés? Pamela Net a [email or web address] e ingrese a beavers cuenta de MyChart. Vic Olmedo P501 en el Arlyss Yahaira \"Search Health Information\" para más información (en inglés) sobre \"Aprenda sobre la presión arterial amandeep - [ Learning About High Blood Pressure ]. \"     Si no tiene radha cuenta, mick saritha en el enlace \"Sign Up Now\". Revisado: 10 mayo, 2017  Versión del contenido: 11.6  © 7889-1971 Healthwise, Incorporated. Las instrucciones de cuidado fueron adaptadas bajo licencia por [facility] ([facility]). Si usted tiene [place] afección médica o sobre estas instrucciones, siempre pregunte a beavers profesional de sixto. Healthwise, Incorporated niega toda garantía o responsabilidad por beavers uso de esta información.

## 2018-08-20 ENCOUNTER — TELEPHONE (OUTPATIENT)
Dept: SLEEP MEDICINE | Age: 43
End: 2018-08-20

## 2018-08-20 ENCOUNTER — OFFICE VISIT (OUTPATIENT)
Dept: FAMILY MEDICINE CLINIC | Age: 43
End: 2018-08-20

## 2018-08-20 VITALS
HEART RATE: 85 BPM | WEIGHT: 219 LBS | OXYGEN SATURATION: 100 % | BODY MASS INDEX: 27.37 KG/M2 | DIASTOLIC BLOOD PRESSURE: 86 MMHG | SYSTOLIC BLOOD PRESSURE: 136 MMHG

## 2018-08-20 DIAGNOSIS — I10 ESSENTIAL HYPERTENSION: Primary | ICD-10-CM

## 2018-08-20 PROCEDURE — 99213 OFFICE O/P EST LOW 20 MIN: CPT | Performed by: FAMILY MEDICINE

## 2018-08-20 PROCEDURE — 1036F TOBACCO NON-USER: CPT | Performed by: FAMILY MEDICINE

## 2018-08-20 PROCEDURE — G8427 DOCREV CUR MEDS BY ELIG CLIN: HCPCS | Performed by: FAMILY MEDICINE

## 2018-08-20 PROCEDURE — G8419 CALC BMI OUT NRM PARAM NOF/U: HCPCS | Performed by: FAMILY MEDICINE

## 2018-08-20 RX ORDER — METOPROLOL SUCCINATE 100 MG/1
100 TABLET, EXTENDED RELEASE ORAL DAILY
Qty: 90 TABLET | Refills: 0 | Status: SHIPPED | OUTPATIENT
Start: 2018-08-20 | End: 2019-01-30 | Stop reason: ALTCHOICE

## 2018-08-20 NOTE — TELEPHONE ENCOUNTER
Spouse Amandeep Arthur called today. Said they received a letter from [facility] Patient [place] [facility], [phone]. The letter informed them that the insurance company (said the letter did not specify which insurance company [employer] or [employer]) needs a face to face with the doctor and also appointment information for office visits after 05/24. The spouse can be reached @ [phone].

## 2019-01-30 ENCOUNTER — OFFICE VISIT (OUTPATIENT)
Dept: FAMILY MEDICINE CLINIC | Age: 44
End: 2019-01-30
Payer: COMMERCIAL

## 2019-01-30 VITALS — DIASTOLIC BLOOD PRESSURE: 100 MMHG | SYSTOLIC BLOOD PRESSURE: 160 MMHG | WEIGHT: 212 LBS | BODY MASS INDEX: 26.5 KG/M2

## 2019-01-30 DIAGNOSIS — I10 ESSENTIAL HYPERTENSION: Primary | ICD-10-CM

## 2019-01-30 DIAGNOSIS — R94.31 ABNORMAL EKG: ICD-10-CM

## 2019-01-30 PROCEDURE — G8484 FLU IMMUNIZE NO ADMIN: HCPCS | Performed by: FAMILY MEDICINE

## 2019-01-30 PROCEDURE — 1036F TOBACCO NON-USER: CPT | Performed by: FAMILY MEDICINE

## 2019-01-30 PROCEDURE — G8419 CALC BMI OUT NRM PARAM NOF/U: HCPCS | Performed by: FAMILY MEDICINE

## 2019-01-30 PROCEDURE — G8427 DOCREV CUR MEDS BY ELIG CLIN: HCPCS | Performed by: FAMILY MEDICINE

## 2019-01-30 PROCEDURE — 99213 OFFICE O/P EST LOW 20 MIN: CPT | Performed by: FAMILY MEDICINE

## 2019-01-30 RX ORDER — AMLODIPINE BESYLATE 5 MG/1
5 TABLET ORAL DAILY
Qty: 30 TABLET | Refills: 0 | Status: SHIPPED | OUTPATIENT
Start: 2019-01-30 | End: 2019-02-26 | Stop reason: SDUPTHER

## 2019-01-30 RX ORDER — METOPROLOL SUCCINATE 50 MG/1
50 TABLET, EXTENDED RELEASE ORAL DAILY
Qty: 30 TABLET | Refills: 0 | Status: SHIPPED | OUTPATIENT
Start: 2019-01-30 | End: 2019-02-26 | Stop reason: SDUPTHER

## 2019-02-19 ENCOUNTER — HOSPITAL ENCOUNTER (OUTPATIENT)
Dept: NON INVASIVE DIAGNOSTICS | Age: 44
Discharge: HOME | End: 2019-02-19
Payer: COMMERCIAL

## 2019-02-19 DIAGNOSIS — R94.31 ABNORMAL EKG: ICD-10-CM

## 2019-02-19 DIAGNOSIS — I10 ESSENTIAL HYPERTENSION: ICD-10-CM

## 2019-02-19 LAB
LEFT VENTRICULAR EJECTION FRACTION HIGH VALUE: 55 %
LEFT VENTRICULAR EJECTION FRACTION MODE: NORMAL
LV EF: 50 %
LV EF: 53 %
LVEF MODALITY: NORMAL

## 2019-02-19 PROCEDURE — 93306 TTE W/DOPPLER COMPLETE: CPT

## 2019-02-25 ENCOUNTER — OFFICE VISIT (OUTPATIENT)
Dept: CARDIOLOGY CLINIC | Age: 44
End: 2019-02-25
Payer: COMMERCIAL

## 2019-02-25 VITALS
HEART RATE: 81 BPM | WEIGHT: 219.9 LBS | SYSTOLIC BLOOD PRESSURE: 130 MMHG | DIASTOLIC BLOOD PRESSURE: 80 MMHG | HEIGHT: 75 IN | BODY MASS INDEX: 27.34 KG/M2

## 2019-02-25 DIAGNOSIS — I10 ESSENTIAL HYPERTENSION: Primary | ICD-10-CM

## 2019-02-25 DIAGNOSIS — E78.2 MIXED HYPERLIPIDEMIA: ICD-10-CM

## 2019-02-25 PROCEDURE — 93000 ELECTROCARDIOGRAM COMPLETE: CPT | Performed by: INTERNAL MEDICINE

## 2019-02-25 PROCEDURE — G8419 CALC BMI OUT NRM PARAM NOF/U: HCPCS | Performed by: INTERNAL MEDICINE

## 2019-02-25 PROCEDURE — 99214 OFFICE O/P EST MOD 30 MIN: CPT | Performed by: INTERNAL MEDICINE

## 2019-02-25 PROCEDURE — G8427 DOCREV CUR MEDS BY ELIG CLIN: HCPCS | Performed by: INTERNAL MEDICINE

## 2019-02-25 PROCEDURE — G8484 FLU IMMUNIZE NO ADMIN: HCPCS | Performed by: INTERNAL MEDICINE

## 2019-02-25 PROCEDURE — 1036F TOBACCO NON-USER: CPT | Performed by: INTERNAL MEDICINE

## 2019-02-26 DIAGNOSIS — I10 ESSENTIAL HYPERTENSION: ICD-10-CM

## 2019-02-26 DIAGNOSIS — R94.31 ABNORMAL EKG: ICD-10-CM

## 2019-02-27 RX ORDER — AMLODIPINE BESYLATE 5 MG/1
5 TABLET ORAL DAILY
Qty: 30 TABLET | Refills: 2 | Status: SHIPPED | OUTPATIENT
Start: 2019-02-27 | End: 2019-04-02 | Stop reason: SDUPTHER

## 2019-02-27 RX ORDER — METOPROLOL SUCCINATE 50 MG/1
50 TABLET, EXTENDED RELEASE ORAL DAILY
Qty: 30 TABLET | Refills: 2 | Status: SHIPPED | OUTPATIENT
Start: 2019-02-27 | End: 2019-05-08 | Stop reason: SDUPTHER

## 2019-03-01 ENCOUNTER — OFFICE VISIT (OUTPATIENT)
Dept: FAMILY MEDICINE CLINIC | Age: 44
End: 2019-03-01
Payer: COMMERCIAL

## 2019-03-01 VITALS
HEART RATE: 88 BPM | DIASTOLIC BLOOD PRESSURE: 90 MMHG | OXYGEN SATURATION: 99 % | SYSTOLIC BLOOD PRESSURE: 120 MMHG | WEIGHT: 221 LBS | BODY MASS INDEX: 27.62 KG/M2

## 2019-03-01 DIAGNOSIS — I10 ESSENTIAL HYPERTENSION: Primary | ICD-10-CM

## 2019-03-01 PROCEDURE — 99213 OFFICE O/P EST LOW 20 MIN: CPT | Performed by: FAMILY MEDICINE

## 2019-03-01 PROCEDURE — G8419 CALC BMI OUT NRM PARAM NOF/U: HCPCS | Performed by: FAMILY MEDICINE

## 2019-03-01 PROCEDURE — 1036F TOBACCO NON-USER: CPT | Performed by: FAMILY MEDICINE

## 2019-03-01 PROCEDURE — G8427 DOCREV CUR MEDS BY ELIG CLIN: HCPCS | Performed by: FAMILY MEDICINE

## 2019-03-01 PROCEDURE — G8484 FLU IMMUNIZE NO ADMIN: HCPCS | Performed by: FAMILY MEDICINE

## 2019-03-28 DIAGNOSIS — E78.2 MIXED HYPERLIPIDEMIA: ICD-10-CM

## 2019-03-28 LAB
CHOLESTEROL, TOTAL: 202 MG/DL (ref 0–199)
HDLC SERPL-MCNC: 33 MG/DL (ref 40–60)
LDL CHOLESTEROL CALCULATED: 119 MG/DL
TRIGL SERPL-MCNC: 251 MG/DL (ref 0–150)
VLDLC SERPL CALC-MCNC: 50 MG/DL

## 2019-04-02 ENCOUNTER — OFFICE VISIT (OUTPATIENT)
Dept: CARDIOLOGY CLINIC | Age: 44
End: 2019-04-02
Payer: COMMERCIAL

## 2019-04-02 VITALS
HEART RATE: 76 BPM | BODY MASS INDEX: 27.37 KG/M2 | OXYGEN SATURATION: 96 % | DIASTOLIC BLOOD PRESSURE: 90 MMHG | HEIGHT: 75 IN | SYSTOLIC BLOOD PRESSURE: 148 MMHG | WEIGHT: 220.1 LBS

## 2019-04-02 DIAGNOSIS — G47.33 OBSTRUCTIVE SLEEP APNEA SYNDROME: ICD-10-CM

## 2019-04-02 DIAGNOSIS — R94.31 ABNORMAL EKG: ICD-10-CM

## 2019-04-02 DIAGNOSIS — E78.49 OTHER HYPERLIPIDEMIA: Primary | ICD-10-CM

## 2019-04-02 DIAGNOSIS — I10 ESSENTIAL HYPERTENSION: ICD-10-CM

## 2019-04-02 PROCEDURE — 99214 OFFICE O/P EST MOD 30 MIN: CPT | Performed by: NURSE PRACTITIONER

## 2019-04-02 PROCEDURE — G8427 DOCREV CUR MEDS BY ELIG CLIN: HCPCS | Performed by: NURSE PRACTITIONER

## 2019-04-02 PROCEDURE — 1036F TOBACCO NON-USER: CPT | Performed by: NURSE PRACTITIONER

## 2019-04-02 PROCEDURE — G8419 CALC BMI OUT NRM PARAM NOF/U: HCPCS | Performed by: NURSE PRACTITIONER

## 2019-04-02 RX ORDER — AMLODIPINE BESYLATE 10 MG/1
10 TABLET ORAL DAILY
Qty: 30 TABLET | Refills: 3 | Status: SHIPPED | OUTPATIENT
Start: 2019-04-02 | End: 2019-05-08 | Stop reason: SDUPTHER

## 2019-04-02 NOTE — PROGRESS NOTES
[address]     Outpatient Follow Up Note    CHIEF COMPLAINT / HPI: Follow Up secondary to hypertension, hyperlipidemia, and OFE         Candice Perez is 44 y.o. male who presents today for a routine follow up  related to the above mentioned issues. Subjective:   Since the time of last office visit, the patient admits their symptoms have not changed. 45 y/o male seen for HTN and \"abnormal EKG\". He does not know about the abnormal EKG and EKG on 2/25/19 was normal.         Today patient stated he is feeling well. Today's B/P- 148/90. He denies any chest pain, palpitations, SOB, dizziness, or edema. With regard to medication therapy the patient has been compliant with prescribed regimen. They have tolerated therapy to date. Past Medical History:   Diagnosis Date    Hypertension     Lumbar disc disorder     With chronic pain managed by pain specialist    Obstructive sleep apnea syndrome      Social History:    Social History     Tobacco Use   Smoking Status Never Smoker   Smokeless Tobacco Never Used     Current Medications:  Current Outpatient Medications   Medication Sig Dispense Refill    amLODIPine (NORVASC) 10 MG tablet Take 1 tablet by mouth daily 30 tablet 3    metoprolol succinate (TOPROL XL) 50 MG extended release tablet Take 1 tablet by mouth daily 30 tablet 2    pregabalin (LYRICA) 100 MG capsule Take 100 mg by mouth 2 times daily. Luz Marina Monas aspirin 325 MG tablet Take 325 mg by mouth daily      Blood Pressure KIT Check blood pressure twice daily 1 kit 0     No current facility-administered medications for this visit. REVIEW OF SYSTEMS:   CONSTITUTIONAL: No major weight gain or loss, fatigue, weakness, night sweats or fever. There's been no change in energy level, sleep pattern, or activity level. HEENT: No new vision difficulties or ringing in the ears. RESPIRATORY: No new SOB, PND, orthopnea or cough.    CARDIOVASCULAR: See HPI  GI: No nausea, vomiting, diarrhea, constipation, abdominal pain or changes in bowel habits. : No urinary frequency, urgency, incontinence hematuria or dysuria. SKIN: No cyanosis or skin lesions. MUSCULOSKELETAL: No new muscle or joint pain. NEUROLOGICAL: No syncope or TIA-like symptoms. PSYCHIATRIC: No anxiety, pain, insomnia or depression    Objective:   PHYSICAL EXAM:        VITALS:  BP (!) 148/90 (Site: Right Upper Arm, Position: Sitting, Cuff Size: Large Adult)   Pulse 76   Ht 6' 3\" (1.905 m)   Wt 220 lb 1.6 oz (99.8 kg)   SpO2 96%   BMI 27.51 kg/m²     CONSTITUTIONAL: Cooperative, no apparent distress, and appears well nourished / developed  NEUROLOGIC:  Awake and orientated to person, place and time. PSYCH: Calm affect. SKIN: Warm and dry. HEENT: Sclera non-icteric, normocephalic, neck supple, no elevation of JVP, normal carotid pulses with no bruits and thyroid normal size. LUNGS:  No increased work of breathing and clear to auscultation, no crackles or wheezing. CARDIOVASCULAR:  Regular rate and rhythm with no murmurs, gallops, rubs, or abnormal heart sounds, normal PMI. The apical impulses not displaced. Heart tones are crisp and normal                                                                                          Cervical veins are not engorged                 JVP less than 8 cm H2O                                                                              The carotid upstroke is normal in amplitude and contour without delay or bruit    ABDOMEN:  Normal bowel sounds, non-distended and non-tender to palpation   EXT: No edema, no calf tenderness. Pulses are present bilaterally.     DATA:    Lab Results   Component Value Date    ALT 36 09/13/2018    AST 19 09/13/2018    ALKPHOS 87 09/13/2018    BILITOT 0.3 09/13/2018     Lab Results   Component Value Date    CREATININE 1.0 09/13/2018    BUN 11 09/13/2018     09/13/2018    K 4.1 09/13/2018     09/13/2018    CO2 24 09/13/2018     No results found for: TSH, C7DAVPW, D6UIDBK, THYROIDAB  Lab Results   Component Value Date    WBC 9.6 09/13/2018    HGB 15.3 09/13/2018    HCT 47.1 09/13/2018    MCV 72.6 (L) 09/13/2018     09/13/2018     No components found for: CHLPL  Lab Results   Component Value Date    TRIG 251 (H) 03/28/2019    TRIG 318 (H) 05/15/2018    TRIG 189 06/06/2017     Lab Results   Component Value Date    HDL 33 (L) 03/28/2019    HDL 30 (L) 05/15/2018    HDL 32 (A) 06/06/2017     Lab Results   Component Value Date    LDLCALC 119 (H) 03/28/2019    LDLCALC see below 05/15/2018    LDLCALC 140 06/06/2017     Lab Results   Component Value Date    LABVLDL 50 03/28/2019    LABVLDL see below 05/15/2018    LABVLDL 35 10/28/2016     Radiology Review:  Pertinent images / reports were reviewed as a part of this visit and reveals the following:    Last ECHO: 2/19/19  Summary   -Low normal global systolic function with an ejection fraction estimated at   50-55%.  -No regional wall motion abnormalities noted.   -Mild mitral regurgitation.   -Normal diastolic function. Avg. E/e'=12.7       Assessment:      Diagnosis Orders   1. Other hyperlipidemia     2. Essential hypertension  amLODIPine (NORVASC) 10 MG tablet   3. Abnormal EKG     4. Obstructive sleep apnea syndrome         Patient  is stable since last OV. Plan:   Increase Norvasc to 10 mg daily- discuss side effects- edema, and patient stated he only takes Lyrica as needed. Follow up in three weeks for a b/P check    I have addressed the patient's cardiac risk factors and adjusted pharmacologic treatment as needed. In addition, I have reinforced the need for patient directed risk factor modification. Further evaluation will be based upon the patient's clinical course and testing results. All questions and concerns were addressed to the patient/family. Alternatives to  treatment were discussed. The patient  currently  is not smoking.  The risks related to smoking were reviewed with the patient. Recommend maintaining a smoke-free lifestyle. Products available for smoking cessation were discussed. Daily weight, low sodium diet were discussed. Patient instructed to call the office with a weight gain: > 3 lbs over night or 5 lbs in one week; swelling, SOB/orthopnea/PND      Thank you for allowing to us to participate in the care of [address] [place] [address].       [facility]

## 2019-05-08 ENCOUNTER — OFFICE VISIT (OUTPATIENT)
Dept: FAMILY MEDICINE CLINIC | Age: 44
End: 2019-05-08
Payer: COMMERCIAL

## 2019-05-08 VITALS
HEART RATE: 82 BPM | WEIGHT: 222.2 LBS | SYSTOLIC BLOOD PRESSURE: 142 MMHG | DIASTOLIC BLOOD PRESSURE: 92 MMHG | BODY MASS INDEX: 27.77 KG/M2 | OXYGEN SATURATION: 99 %

## 2019-05-08 DIAGNOSIS — I10 ESSENTIAL HYPERTENSION: ICD-10-CM

## 2019-05-08 PROCEDURE — 1036F TOBACCO NON-USER: CPT | Performed by: FAMILY MEDICINE

## 2019-05-08 PROCEDURE — 99213 OFFICE O/P EST LOW 20 MIN: CPT | Performed by: FAMILY MEDICINE

## 2019-05-08 PROCEDURE — G8419 CALC BMI OUT NRM PARAM NOF/U: HCPCS | Performed by: FAMILY MEDICINE

## 2019-05-08 PROCEDURE — G8427 DOCREV CUR MEDS BY ELIG CLIN: HCPCS | Performed by: FAMILY MEDICINE

## 2019-05-08 RX ORDER — AMLODIPINE BESYLATE 10 MG/1
10 TABLET ORAL DAILY
Qty: 90 TABLET | Refills: 1 | Status: SHIPPED | OUTPATIENT
Start: 2019-05-08 | End: 2019-08-09 | Stop reason: SDUPTHER

## 2019-05-08 RX ORDER — METOPROLOL SUCCINATE 50 MG/1
50 TABLET, EXTENDED RELEASE ORAL DAILY
Qty: 90 TABLET | Refills: 1 | Status: SHIPPED | OUTPATIENT
Start: 2019-05-08 | End: 2019-08-09 | Stop reason: SDUPTHER

## 2019-05-08 NOTE — PATIENT INSTRUCTIONS
Goals        Blood Pressure     Blood Pressure < 130/80 (pt-stated)      Self- Management Goals for the Patient with Hypertension: It is important to have goals to work towards when you have Hypertension. Below is a list of goals your doctor would like you to work towards to help control your hypertension and also maintain and improve your overall health. Please select one of these goals to try before your next follow up visit:    Goal: I will take all medications as prescribed by my doctor, and I will call the office if I am having any medication problems. Guess your barriers before they happen. Everyone runs into barriers to their goals. You may already know what's going to get in your way. Write down these problems (cost? time? stress? fear?), and think of ways to get around them.      Barriers to success: none  Plan for overcoming my barriers: N/A     Confidence: 7/10  Date goal set: 5/7/18  Date goal attained:

## 2019-05-08 NOTE — PROGRESS NOTES
Social History     Tobacco Use   Smoking Status Never Smoker   Smokeless Tobacco Never Used       PMH, surgeries, SH, FH, allergies reviewed. Medication list reviewed. Chief Complaint   Patient presents with    Hypertension     Hypertension:  Denies chest pain, SOB, cough, visual changes, dizziness, palpitations or headache. He is adherent to a low sodium diet. He is  adherent to an exercise program.  He is  compliant with medications. Blood pressure typically runs 130's/90's outside of the office. Objective:   VS reviewed    Vitals:    05/08/19 0943 05/08/19 0958   BP: (!) 140/100 (!) 142/92   Site: Left Upper Arm    Position: Sitting    Cuff Size: Large Adult    Pulse: 82    SpO2: 99%    Weight: 222 lb 3.2 oz (100.8 kg)      Body mass index is 27.77 kg/m². Wt Readings from Last 3 Encounters:   05/08/19 222 lb 3.2 oz (100.8 kg)   04/02/19 220 lb 1.6 oz (99.8 kg)   03/01/19 221 lb (100.2 kg)     BP Readings from Last 3 Encounters:   05/08/19 (!) 142/92   04/02/19 (!) 148/90   03/01/19 (!) 120/90       Physical Exam   Constitutional: He is oriented to person, place, and time. No distress. Neck: No JVD present. No thyromegaly present. Cardiovascular: Normal rate, regular rhythm, normal heart sounds and intact distal pulses. No murmur heard. Pulmonary/Chest: Effort normal and breath sounds normal. No respiratory distress. Musculoskeletal: He exhibits no edema. Neurological: He is alert and oriented to person, place, and time.    Psychiatric: Judgment normal.         Lab Results   Component Value Date     09/13/2018    K 4.1 09/13/2018     09/13/2018    CO2 24 09/13/2018    BUN 11 09/13/2018    CREATININE 1.0 09/13/2018    GLUCOSE 111 (H) 09/13/2018    CALCIUM 9.7 09/13/2018    PROT 7.4 09/13/2018    LABALBU 4.5 09/13/2018    BILITOT 0.3 09/13/2018    ALKPHOS 87 09/13/2018    AST 19 09/13/2018    ALT 36 09/13/2018    LABGLOM >60 09/13/2018    GFRAA >60 09/13/2018    AGRATIO 1.6 they happen. Everyone runs into barriers to their goals. You may already know what's going to get in your way. Write down these problems (cost? time? stress? fear?), and think of ways to get around them. Barriers to success: none  Plan for overcoming my barriers: N/A     Confidence: 7/10  Date goal set: 5/7/18  Date goal attained:                    Return in about 3 months (around 8/8/2019) for HTN . Scott received counseling on the following healthy behaviors: nutrition, exercise and medication adherence    Patient given educational materials on Hypertension    I have instructed Scott to complete a self tracking handout on Blood Pressures  and instructed them to bring it with them to his next appointment. Discussed use, benefit, and side effects of prescribed medications. Barriers to medication compliance addressed. All patient questions answered. Pt voiced understanding.

## 2019-08-09 ENCOUNTER — OFFICE VISIT (OUTPATIENT)
Dept: FAMILY MEDICINE CLINIC | Age: 44
End: 2019-08-09
Payer: COMMERCIAL

## 2019-08-09 VITALS
BODY MASS INDEX: 28.1 KG/M2 | WEIGHT: 224.8 LBS | OXYGEN SATURATION: 98 % | SYSTOLIC BLOOD PRESSURE: 138 MMHG | TEMPERATURE: 97.9 F | DIASTOLIC BLOOD PRESSURE: 84 MMHG | RESPIRATION RATE: 15 BRPM | HEART RATE: 77 BPM

## 2019-08-09 DIAGNOSIS — I10 ESSENTIAL HYPERTENSION: ICD-10-CM

## 2019-08-09 PROCEDURE — 99213 OFFICE O/P EST LOW 20 MIN: CPT | Performed by: FAMILY MEDICINE

## 2019-08-09 PROCEDURE — G8419 CALC BMI OUT NRM PARAM NOF/U: HCPCS | Performed by: FAMILY MEDICINE

## 2019-08-09 PROCEDURE — 1036F TOBACCO NON-USER: CPT | Performed by: FAMILY MEDICINE

## 2019-08-09 PROCEDURE — G8427 DOCREV CUR MEDS BY ELIG CLIN: HCPCS | Performed by: FAMILY MEDICINE

## 2019-08-09 RX ORDER — AMLODIPINE BESYLATE 10 MG/1
10 TABLET ORAL DAILY
Qty: 90 TABLET | Refills: 1 | Status: SHIPPED | OUTPATIENT
Start: 2019-08-09 | End: 2019-12-17 | Stop reason: SDUPTHER

## 2019-08-09 RX ORDER — METOPROLOL SUCCINATE 50 MG/1
50 TABLET, EXTENDED RELEASE ORAL DAILY
Qty: 90 TABLET | Refills: 1 | Status: SHIPPED | OUTPATIENT
Start: 2019-08-09 | End: 2019-12-17 | Stop reason: SDUPTHER

## 2019-12-17 ENCOUNTER — OFFICE VISIT (OUTPATIENT)
Dept: PRIMARY CARE CLINIC | Age: 44
End: 2019-12-17
Payer: COMMERCIAL

## 2019-12-17 VITALS
BODY MASS INDEX: 27.7 KG/M2 | TEMPERATURE: 97.3 F | DIASTOLIC BLOOD PRESSURE: 88 MMHG | HEART RATE: 82 BPM | OXYGEN SATURATION: 98 % | SYSTOLIC BLOOD PRESSURE: 136 MMHG | WEIGHT: 221.6 LBS

## 2019-12-17 DIAGNOSIS — I10 ESSENTIAL HYPERTENSION: ICD-10-CM

## 2019-12-17 DIAGNOSIS — R53.83 OTHER FATIGUE: Primary | ICD-10-CM

## 2019-12-17 PROCEDURE — 99213 OFFICE O/P EST LOW 20 MIN: CPT | Performed by: FAMILY MEDICINE

## 2019-12-17 PROCEDURE — G8419 CALC BMI OUT NRM PARAM NOF/U: HCPCS | Performed by: FAMILY MEDICINE

## 2019-12-17 PROCEDURE — G8484 FLU IMMUNIZE NO ADMIN: HCPCS | Performed by: FAMILY MEDICINE

## 2019-12-17 PROCEDURE — G8427 DOCREV CUR MEDS BY ELIG CLIN: HCPCS | Performed by: FAMILY MEDICINE

## 2019-12-17 PROCEDURE — 1036F TOBACCO NON-USER: CPT | Performed by: FAMILY MEDICINE

## 2019-12-17 RX ORDER — METOPROLOL SUCCINATE 50 MG/1
50 TABLET, EXTENDED RELEASE ORAL DAILY
Qty: 90 TABLET | Refills: 1 | Status: SHIPPED | OUTPATIENT
Start: 2019-12-17 | End: 2020-02-19 | Stop reason: SDUPTHER

## 2019-12-17 RX ORDER — AMLODIPINE BESYLATE 10 MG/1
10 TABLET ORAL DAILY
Qty: 90 TABLET | Refills: 1 | Status: SHIPPED | OUTPATIENT
Start: 2019-12-17 | End: 2020-02-19 | Stop reason: SDUPTHER

## 2020-02-19 RX ORDER — METOPROLOL SUCCINATE 50 MG/1
50 TABLET, EXTENDED RELEASE ORAL DAILY
Qty: 90 TABLET | Refills: 1 | Status: SHIPPED | OUTPATIENT
Start: 2020-02-19

## 2020-02-19 RX ORDER — AMLODIPINE BESYLATE 10 MG/1
10 TABLET ORAL DAILY
Qty: 90 TABLET | Refills: 1 | Status: SHIPPED | OUTPATIENT
Start: 2020-02-19

## 2020-03-26 ENCOUNTER — TELEPHONE (OUTPATIENT)
Dept: PRIMARY CARE CLINIC | Age: 45
End: 2020-03-26

## 2022-07-27 ENCOUNTER — HOSPITAL ENCOUNTER (OUTPATIENT)
Dept: PHYSICAL THERAPY | Age: 47
Setting detail: THERAPIES SERIES
Discharge: HOME | End: 2022-07-27
Payer: COMMERCIAL

## 2022-07-27 PROCEDURE — 97161 PT EVAL LOW COMPLEX 20 MIN: CPT | Performed by: PHYSICAL THERAPIST

## 2022-07-27 PROCEDURE — 97110 THERAPEUTIC EXERCISES: CPT | Performed by: PHYSICAL THERAPIST

## 2022-07-27 PROCEDURE — 97112 NEUROMUSCULAR REEDUCATION: CPT | Performed by: PHYSICAL THERAPIST

## 2022-07-27 NOTE — PLAN OF CARE
[address] [address], [address] [place] [address] Drive  Phone: [phone]   Fax:     [phone]                                                       Physical Therapy Certification    Dear Clarice Horn MD,    We had the pleasure of evaluating the following patient for physical therapy services at [address] [place] [address]. A summary of our findings can be found in the initial assessment below. This includes our plan of care. If you have any questions or concerns regarding these findings, please do not hesitate to contact me at the office phone number checked above. Thank you for the referral.       Physician Signature:_______________________________Date:__________________  By signing above (or electronic signature), therapists plan is approved by physician      Patient: Marito De Guzman   : 1975   MRN: [id number]  Referring Physician: No ref. provider found        Evaluation Date: 2022      Medical Diagnosis Information:  Diagnosis: M43,02 cervical spondylosis   Treatment Diagnosis: M43,02 cervical spondylosis, M54.2 Cervicalgia                                         Insurance information: PT Insurance Information: Denisa Bacon /60 visits/ No auth    Precautions/ Contra-indications: HTN, Interpretor required /Togolese  Latex Allergy:  [x]NO      []YES  Preferred Language for Healthcare:   [x]English       []Other:    C-SSRS Triggered by Intake questionnaire (Past 2 wk assessment ):   [x] No, Questionnaire did not trigger screening.   [] Yes, Patient intake triggered C-SSRS Screening     [] Completed, no further action required. [] Completed, PCP notified via Epic    SUBJECTIVE: Patient stated complaint: Neck pain started about 2 months. Insidious in onset. Pain starts in neck and radiates to R UE. Reports x-rays of neck showed bone spurs per patient report. Describes pain as cramping and numbness in R UE when looking up. Pressure noted in neck constantly. Pain has stayed the same since onset. Pain management MD referred to PT for neck. Currently being treated for lumbar disc disorder as well from Dr. Paulette Diaz. Fear avoidance: I should not do physical activities that (might) make my pain worse   [] True   [x] False     Relevant Medical History:HTN, lumbar disc disorder , No previous surgeries. Functional Outcome: FOTO physical FS primary measure score = ; Risk adjusted =    Unable to perform this visit due to time constraint. Perform FOTO N.V.    Pain Scale: 4-8/10 with moving neck backwards. Easing factors:avoiding positions that reproduce pain. UE over head  Provocative factors: looking up, moving R UE     Type: [x]Constant   []Intermittent  []Radiating []Localized []other:     Numbness/Tingling: R UE numbness/tingling to hand    Occupation/School: currently on disability for low back. Living Status/Prior Level of Function: Prior to this injury / incident, pt was independent with ADLs and IADLs, R hand dominant. OBJECTIVE:   Palpation: Tender to palpation B UT/Levator R>L    Functional Mobility/Transfers: IND    Posture: slight forward head/ mild pec tightness    Bandages/Dressings/Incisions: NT    Gait: (include devices/WB status):  WNL     Dermatomes Normal Abnormal Comments   Top of head (C1) x     Posterior occipital region (C2) x     Side of neck (C3) x     Top of shoulder (C4) x     Lateral deltoid (C5) x     Tip of thumb (C6) x     Distal middle finger (C7) x     Distal fifth finger (C8) x     Medial forearm (T1) x     Lower extremity x         Reflexes Normal Abnormal Comments   C5-6 Biceps x     C5-6 Brachioradialis x     C7-8 Triceps x     Berumens x     S1-2 Seated achilles      S1-2 Prone knee bend      L3-4 Patellar tendon      Clonus      Babinski          CERV ROM     Cervical Flexion 25 No pain   Cervical Extension 25 Pain noted to neck/R hand   Cervical SB WFL R/L No pain   Cervical rotation ~70R, WNL L Pain UT with turning to right        ROM Left Right   Shoulder Flex [facility] WFL   Shoulder Abd [facility]   Shoulder ER     Shoulder IR  Pain in shld/upper arm             Strength / Myotomes Left Right   Cervical Flexion (C1-2)     Cervical Side-bending (C3)     Shoulder Shrug (C4)     Shoulder Flex     Shoulder Scap     Shoulder Abduction (C5) 4+/5  4/5 p   Shoulder ER     Shoulder IR     Biceps (C6) 4/5 4/5   Triceps (C7) 4/5 4/5   Wrist Extension (C6)     Wrist Flexion (C7)           Thumb Abduction (C8)     Finger Abduction (T1)       Lower extremity myotomes:   []Normal     []Abnormal     Joint mobility: Cont to assess   []Normal    []Hypo   []Hyper    Orthopaedic Special Tests  Positive  Negative  NT Comments    Hautard's        Rhomberg       Sharps-Yaya  x     Cervical Torsion / Body Rotation        C2 Kick  x     Modified Shear  x     Compression in quad x      Distraction  x                                    [x] Patient history, allergies, meds reviewed. Medical chart reviewed. See intake form. Review Of Systems (ROS):  [x]Performed Review of systems (Integumentary, CardioPulmonary, Neurological) by intake and observation. Intake form has been scanned into medical record. Patient has been instructed to contact their primary care physician regarding ROS issues if not already being addressed at this time.       Co-morbidities/Complexities (which will affect course of rehabilitation):   []None        []Hx of COVID   Arthritic conditions   []Rheumatoid arthritis (M05.9)  []Osteoarthritis (M19.91)  []Gout   Cardiovascular conditions   [x]Hypertension (I10)  []Hyperlipidemia (E78.5)  []Angina pectoris (I20)  []Atherosclerosis (I70)  []Pacemaker  []Hx of CABG/stent/  cardiac surgeries   Musculoskeletal conditions   []Disc pathology   []Congenital spine pathologies   []Osteoporosis (M81.8)  []Osteopenia (M85.8)  []Scoliosis       Endocrine conditions   []Hypothyroid (E03.9)  []Hyperthyroid Gastrointestinal conditions   []Constipation (O66.79)   Metabolic conditions   []Morbid obesity (E66.01)  []Diabetes type 1(E10.65) or 2 (E11.65)   []Neuropathy (G60.9)     Cardio/Pulmonary conditions   []Asthma (J45)  []Coughing   []COPD (J44.9)  []CHF  []A-fib   Psychological Disorders  []Anxiety (F41.9)  []Depression (F32.9)   []Other:   Developmental Disorders  []Autism (F84.0)  []CP (G80)  []Down Syndrome (Q90.9)  []Developmental delay     Neurological conditions  []Prior Stroke (I69.30)  []Parkinson's (G20)  []Encephalopathy (G93.40)  []MS (G35)  []Post-polio (G14)  []SCI  []TBI  []ALS Other conditions  []Fibromyalgia (M79.7)  []Vertigo  []Syncope  []Kidney Failure  []Cancer      []currently undergoing                treatment  []Pregnancy  []Incontinence   Prior surgeries  []involved limb  []previous spinal surgery  [] section birth  []hysterectomy  []bowel / bladder surgery  []other relevant surgeries   []Other:              Barriers to/and or personal factors that will affect rehab potential:              []Age  []Sex   []Smoker              []Motivation/Lack of Motivation                        [x]Co-Morbidities              []Cognitive Function, education/learning barriers              []Environmental, home barriers              []profession/work barriers  []past PT/medical experience  []other:  Justification:     Falls Risk Assessment (30 days):   [x] Falls Risk assessed and no intervention required. [] Falls Risk assessed and Patient requires intervention due to being higher risk   TUG score (>12s at risk):     [] Falls education provided, including         ASSESSMENT: Patient presents with constant neck pain and pressure with UE radiating pain /N/T when turning to R or with cervical extension. Presents with decreased cervical AROM , posture awareness and decreased functional ADL's that involve neck extension. Symptoms are relieved with neutral positioning in neck and abolished with manual traction.  Pt requires skilled intervention to restore ROM, strength, functional endurance and balance in order to perform ADLs without significant symptoms or limitations. Functional Impairments:     [x]Noted cervical/thoracic/GHJ joint hypomobility   []Noted cervical/thoracic/GHJ joint hypermobility   [x]Decreased cervical/UE functional ROM   []Noted Headache pain aggravated by neck movements with/without dizziness   []Abnormal reflexes/sensation/myotomal/dermatomal deficits   []Decreased DCF control or ability to hold head up   [x]Decreased RC/scapular/core strength and neuromuscular control    []Decreased UE functional strength   []other:      Functional Activity Limitations (from functional questionnaire and intake)   []Reduced ability to tolerate prolonged functional positions   []Reduced ability or difficulty with changes of positions or transfers between positions   [x]Reduced ability to maintain good posture and demonstrate good body mechanics with sitting, bending, and lifting   [] Reduced ability or tolerance with driving and/or computer work   [x]Reduced ability to perform lifting, reaching, carrying tasks   []Reduced ability to concentrate   [x]Reduced ability to sleep on side   []Reduced ability to tolerate any impact through UE or spine   []Reduced ability to ambulate prolonged functional periods/distances   [x]other:reaching overhead and looking up./reaching behind back    Participation Restrictions   []Reduced participation in self care activities   [x]Reduced participation in home management activities   []Reduced participation in work activities   []Reduced participation in social activities. []Reduced participation in sport/recreational activities.     Classification/Subgrouping:   []signs/symptoms consistent with neck pain with mobility deficits     []signs/symptoms consistent with neck pain with movement coordinated impairments    [x]signs/symptoms consistent with neck pain with radiating pain    []signs/symptoms consistent with neck pain with headaches (cervicogenic)    [x]Signs/symptoms consistent with nerve root involvement including myotome & dermatome dysfunction   []sign/symptoms consistent with facet dysfunction of cervical and thoracic spine    []signs/symptoms consistent suggesting central cord compression/UMN syndromes   []signs/symptoms consistent with discogenic cervical pain   []signs/symptoms consistent with rib dysfunction   [x]signs/symptoms consistent with postural dysfunction   []signs/symptoms consistent with shoulder pathology    []signs/symptoms consistent with post-surgical status including decreased ROM, strength and function.    []signs/symptoms consistent with pathology which may benefit from Dry Needling   []signs/symptoms which may limit the use of advanced manual therapy techniques: (Hypertension, recent trauma, intolerance to end range positions, prior TIA, visual issues, UE myotomes loss )     Prognosis/Rehab Potential:      []Excellent   [x]Good    []Fair   []Poor    Tolerance of evaluation/treatment:    []Excellent   [x]Good    []Fair   []Poor    Physical Therapy Evaluation Complexity Justification  [x] A history of present problem with:  [] no personal factors and/or comorbidities that impact the plan of care;  [x]1-2 personal factors and/or comorbidities that impact the plan of care  []3 personal factors and/or comorbidities that impact the plan of care  [x] An examination of body systems using standardized tests and measures addressing any of the following: body structures and functions (impairments), activity limitations, and/or participation restrictions;:  [] a total of 1-2 or more elements   [x] a total of 3 or more elements   [] a total of 4 or more elements   [x] A clinical presentation with:  [x] stable and/or uncomplicated characteristics   [] evolving clinical presentation with changing characteristics  [] unstable and unpredictable characteristics;   [x] Clinical decision making of [x] low, [] moderate, [] high complexity using standardized patient assessment instrument and/or measurable assessment of functional outcome. [x] EVAL (LOW) 85719 (typically 20 minutes face-to-face)  [] EVAL (MOD) 03737 (typically 30 minutes face-to-face)  [] EVAL (HIGH) 75512 (typically 45 minutes face-to-face)  [] RE-EVAL     PLAN:   Frequency/Duration:  2 days per week for 12 Weeks:  Interventions:  [x]  Therapeutic exercise including: strength training, ROM, for cervical spine,scapula, core and Upper extremity, including postural re-education. [x]  NMR activation and proprioception for Deep cervical flexors, periscapular and RC muscles and Core, including postural re-education. [x]  Manual therapy as indicated for C/T spine, ribs, Soft tissue to include: Dry Needling/IASTM, STM, PROM, Gr I-IV mobilizations, manipulation. [x] Modalities as needed that may include: thermal agents, E-stim, Biofeedback, US, iontophoresis as indicated  [x] Patient education on joint protection, postural re-education, activity modification, progression of HEP. HEP instruction: Written HEP instructions provided and reviewed. Access Code: [email or web address]  URL: [email or web address]  Date: 07/27/2022  Prepared by: Son Shay    Exercises  Supine Chin Tuck - 2 x daily - 7 x weekly - 2 sets - 10 reps - 5 hold  Shoulder External Rotation and Scapular Retraction with Resistance - 2 x daily - 7 x weekly - 2 sets - 10 reps - 5 hold  Seated Upper Trapezius Stretch - 2 x daily - 7 x weekly - 1-2 sets - 5 reps - 30 hold    GOALS:  Patient stated goal: Abolish pain and cramping sensation in neck/UE  [] Progressing: [] Met: [] Not Met: [] Adjusted    Therapist goals for Patient:   Short Term Goals: To be achieved in: 2 weeks  1. Independent in HEP and progression per patient tolerance, in order to prevent re-injury. [] Progressing: [] Met: [] Not Met: [] Adjusted  2.  Patient will have a decrease in pain to facilitate improvement in movement, function, and ADLs as indicated by Functional Deficits. [] Progressing: [] Met: [] Not Met: [] Adjusted    Long Term Goals: To be achieved in: 12 weeks  1. FOTO score of at least TBD to assist with reaching prior level of function. [] Progressing: [] Met: [] Not Met: [] Adjusted  2. Patient will demonstrate increased AROM to [facility] of cervical/thoracic spine to allow for proper joint functioning as indicated by patients Functional Deficits. [] Progressing: [] Met: [] Not Met: [] Adjusted  3. Patient will demonstrate an increase in postural awareness and control and activation of  Deep cervical stabilizers to allow for proper functional mobility as indicated by patients Functional Deficits. [] Progressing: [] Met: [] Not Met: [] Adjusted  4. Patient will return to functional activities including looking up with head without increased symptoms or restriction. [] Progressing: [] Met: [] Not Met: [] Adjusted  5.  Patient to be able to reach overhead and behind back without radicular pain (patient specific functional goal)    [] Progressing: [] Met: [] Not Met: [] Adjusted     Electronically signed by:  Gil Knox, [address] T-

## 2022-07-27 NOTE — FLOWSHEET NOTE
[address] [facility]  Phone: [phone]   Fax: [phone]    Physical Therapy Treatment Note/ Progress Report:     Date:  2022    Patient Name:  Nancy Camilo    :  1975  MRN: [id number]  Restrictions/Precautions:    Medical/Treatment Diagnosis Information:  Diagnosis: M43,02 cervical spondylosis  Treatment Diagnosis: M43,02 cervical spondylosis, M54.2 Cervicalgia  Insurance/Certification information:  PT Insurance Information: Sean Hidalgo Oar /60 visits/ No auth  Physician Information:  Lucy Corona MD    Plan of care signed (Y/N): []  Yes [x]  No     Date of Patient follow up with Physician: ?     Progress Report: []  Yes  [x]  No     Date Range for reporting period:  Beginnin22  Ending:     Progress report due (10 Rx/or 30 days whichever is less): visit #10 or 92 (date)     Recertification due (POC duration/ or 90 days whichever is less): visit # or 12 weeks 10/19/22 (date)     Visit # Insurance Allowable Auth required? Date Range   1 60 []  Yes  [x]  No        Units approved Units used Date Range          Latex Allergy:  [x]NO      []YES  Preferred Language for Healthcare:   [x]English       []other:    Functional Scale:           Date assessed:    FOTO physical FS primary measure score = ; risk adjusted =   Measure N.V. Unable due to time constraint this visit. Pain level:  8/10     SUBJECTIVE:  See eval    OBJECTIVE: See eval. Responded well to manual traction. Consider mechanical traction N.V.  Observation:   Test measurements:  Neck ext and Right rotation increase UE /neck pain. RESTRICTIONS/PRECAUTIONS: HTN, lumbar disc disorder, NEEDS INTERPRETATION SERVICES for Maltese.     Exercises/Interventions:   Therapeutic Exercise (94210) Resistance / level Sets/sec Reps Notes   Supine chin tucks on 1 pillow   H5 2x10 reps HEP   No money   H5 X10 reps HEP   UT stretches to L side  H30 5 reps HEP Therapeutic Activities (57167)                                                 NMR re-education (26809)       Patient education with positioning pillows in sidelying or supine to avoid neck pain/UE pain, HEP instruction, avoidance of neck ext and proper posture 15'                                  Manual Intervention (34372)       Cerv mobs/manip N.V. Thoracic mobs/manip       CT manip       Rib mobilizations       STM       Manual traction in supine 1 pillow 5'   Responded well. Modalities: Consider mechanical traction N.V. Patient education:  -pt educated on diagnosis, prognosis and expectations for rehab  -all pt questions were answered    Home Exercise Program:  HEP instruction: Written HEP instructions provided and reviewed. Access Code: [email or web address]  URL: [email or web address]  Date: 07/27/2022  Prepared by: Angela Candelario     Exercises  Supine Chin Tuck - 2 x daily - 7 x weekly - 2 sets - 10 reps - 5 hold  Shoulder External Rotation and Scapular Retraction with Resistance - 2 x daily - 7 x weekly - 2 sets - 10 reps - 5 hold  Seated Upper Trapezius Stretch - 2 x daily - 7 x weekly - 1-2 sets - 5 reps - 30 hold       Therapeutic Exercise and NMR EXR  [x] (91162) Provided verbal/tactile cueing for activities related to strengthening, flexibility, endurance, ROM  for improvements in cervical, postural, scapular, scapulothoracic and UE control with self care, reaching, carrying, lifting, house/yardwork, driving/computer work.     [x] (78664) Provided verbal/tactile cueing for activities related to improving balance, coordination, kinesthetic sense, posture, motor skill, proprioception  to assist with cervical, scapular, scapulothoracic and UE control with self care, reaching, carrying, lifting, house/yardwork, driving/computer work.  [] (64787) Therapist is in constant attendance of 2 or more patients providing skilled therapy interventions, but not providing any significant amount of measurable one-on-one time to either patient, for improvements in cervical, scapular, scapulothoracic and UE control with self care, reaching, carrying, lifting, house/yardwork, driving, computer work. Therapeutic Activities:    [] (56510 or 11857) Provided verbal/tactile cueing for activities related to improving balance, coordination, kinesthetic sense, posture, motor skill, proprioception and motor activation to allow for proper function of cervical, scapular, scapulothoracic and UE control with self care, carrying, lifting, driving/computer work.      Home Exercise Program:    [x] (75752) Reviewed/Progressed HEP activities related to strengthening, flexibility, endurance, ROM of cervical, scapular, scapulothoracic and UE control with self care, reaching, carrying, lifting, house/yardwork, driving/computer work  [] (80256) Reviewed/Progressed HEP activities related to improving balance, coordination, kinesthetic sense, posture, motor skill, proprioception of cervical, scapular, scapulothoracic and UE control with self care, reaching, carrying, lifting, house/yardwork, driving/computer work      Manual Treatments:  PROM / STM / Oscillations-Mobs:  G-I, II, III, IV (PA's, Inf., Post.)  [x] (27073) Provided manual therapy to mobilize soft tissue/joints of cervical/CT, scapular GHJ and UE for the purpose of decreasing headache, modulating pain, promoting relaxation,  increasing ROM, reducing/eliminating soft tissue swelling/inflammation/restriction, improving soft tissue extensibility and allowing for proper ROM for normal function with self care, reaching, carrying, lifting, house/yardwork, driving/computer work    Charges:  Timed Code Treatment Minutes: 30'   Total Treatment Minutes: 50'       [x] EVAL - LOW (49351)   [] EVAL - MOD (65041)  [] EVAL - HIGH (09714)  [] RE-EVAL (91488)  [x] FH(81519) x  1     [] Ionto  [x] NMR (42999) x 1      [] Vaso  [] Manual (67844) x       [] Ultrasound  [] TA x        [] [facility] Traction (83716)  [] Aquatic Therapy x     [] ES (un) (47461):   [] Home Management Training x  [] ES(attended) (90784)   [] Dry Needling 1-2 muscles (99591):  [] Dry Needling 3+ muscles (702856  [] Group:      [] Other:     GOALS: (cut and paste from eval)  Patient stated goal: Abolish pain and cramping sensation in neck/UE  [] Progressing: [] Met: [] Not Met: [] Adjusted     Therapist goals for Patient:  Short Term Goals: To be achieved in: 2 weeks  1. Independent in HEP and progression per patient tolerance, in order to prevent re-injury. [] Progressing: [] Met: [] Not Met: [] Adjusted  2. Patient will have a decrease in pain to facilitate improvement in movement, function, and ADLs as indicated by Functional Deficits. [] Progressing: [] Met: [] Not Met: [] Adjusted     Long Term Goals: To be achieved in: 12 weeks  1. FOTO score of at least TBD to assist with reaching prior level of function. [] Progressing: [] Met: [] Not Met: [] Adjusted  2. Patient will demonstrate increased AROM to [facility] of cervical/thoracic spine to allow for proper joint functioning as indicated by patients Functional Deficits. [] Progressing: [] Met: [] Not Met: [] Adjusted  3. Patient will demonstrate an increase in postural awareness and control and activation of  Deep cervical stabilizers to allow for proper functional mobility as indicated by patients Functional Deficits. [] Progressing: [] Met: [] Not Met: [] Adjusted  4. Patient will return to functional activities including looking up with head without increased symptoms or restriction. [] Progressing: [] Met: [] Not Met: [] Adjusted  5. Patient to be able to reach overhead and behind back without radicular pain (patient specific functional goal)    [] Progressing: [] Met: [] Not Met: [] Adjusted          Overall Progression Towards Functional goals/ Treatment Progress Update:  [] Patient is progressing as expected towards functional goals listed.     [] Progression is slowed due to complexities/Impairments listed. [] Progression has been slowed due to co-morbidities. [x] Plan just implemented, too soon to assess goals progression <30days   [] Goals require adjustment due to lack of progress  [] Patient is not progressing as expected and requires additional follow up with physician  [] Other    Persisting Functional Limitations/Impairments:  [x]Sitting []Standing   []Walking []Squatting/bending    []Stairs []ADL's    []Transfers [x]Reaching  [x]Housework [x]Lifting  [x]Driving []Job related tasks  []Sports/Recreation  [x]Sleeping  []Other:    ASSESSMENT:  See eval.Patient presents with constant neck pain and pressure with UE radiating pain /N/T when turning to R or with cervical extension. Presents with decreased cervical AROM , posture awareness and decreased functional ADL's that involve neck extension. Symptoms are relieved with neutral positioning in neck and abolished with manual traction. Pt requires skilled intervention to restore ROM, strength, functional endurance and balance in order to perform ADLs without significant symptoms or limitations. Treatment/Activity Tolerance:  [x] Patient able to complete tx  [] Patient limited by fatique  [] Patient limited by pain   [] Patient limited by other medical complications  [] Other:     Prognosis: [x] Good [] Fair  [] Poor    Patient Requires Follow-up: [x] Yes  [] No    PLAN: See eval. PT 2x / week for 12 weeks. [] Continue per plan of care [] Alter current plan (see comments)  [x] Plan of care initiated [] Hold pending MD visit [] Discharge    Electronically signed by: Bin Sarabia, [address] [place] [address]      Note: If patient does not return for scheduled/ recommended follow up visits, this note will serve as a discharge from care along with most recent update on progress.

## 2022-08-08 ENCOUNTER — HOSPITAL ENCOUNTER (OUTPATIENT)
Dept: PHYSICAL THERAPY | Age: 47
Setting detail: THERAPIES SERIES
Discharge: HOME | End: 2022-08-08
Payer: COMMERCIAL

## 2022-08-08 NOTE — FLOWSHEET NOTE
[address] [facility]      Physical Therapy  Cancellation/No-show Note  Patient Name:  Dallin Lugo  :  1975   Date:  2022  Cancelled visits to date: 0  No-shows to date: 1    For today's appointment patient:  []  Cancelled  []  Rescheduled appointment  [x]  No-show     Reason given by patient:  []  Patient ill  []  Conflicting appointment  []  No transportation    []  Conflict with work  [x]  No reason given  []  Other:     Comments:      Electronically signed by:  Mo Monsalve, PT

## 2022-08-10 ENCOUNTER — HOSPITAL ENCOUNTER (OUTPATIENT)
Dept: PHYSICAL THERAPY | Age: 47
Setting detail: THERAPIES SERIES
Discharge: HOME | End: 2022-08-10
Payer: COMMERCIAL

## 2022-08-10 PROCEDURE — 97110 THERAPEUTIC EXERCISES: CPT | Performed by: PHYSICAL THERAPIST

## 2022-08-10 PROCEDURE — 97140 MANUAL THERAPY 1/> REGIONS: CPT | Performed by: PHYSICAL THERAPIST

## 2022-08-10 PROCEDURE — 97112 NEUROMUSCULAR REEDUCATION: CPT | Performed by: PHYSICAL THERAPIST

## 2022-08-10 NOTE — FLOWSHEET NOTE
[address] [facility]  Phone: [phone]   Fax: [phone]    Physical Therapy Treatment Note/ Progress Report:     Date:  8/10/2022    Patient Name:  Grant Maria    :  1975  MRN: [id number]  Restrictions/Precautions:    Medical/Treatment Diagnosis Information:  Diagnosis: M43,02 cervical spondylosis  Treatment Diagnosis: M43,02 cervical spondylosis, M54.2 Cervicalgia  Insurance/Certification information:  PT Insurance Information: Ivory Lei /60 visits/ No auth  Physician Information:  Taran Calixto MD    Plan of care signed (Y/N): []  Yes [x]  No     Date of Patient follow up with Physician: ?     Progress Report: []  Yes  [x]  No     Date Range for reporting period:  Beginnin22  Ending:     Progress report due (10 Rx/or 30 days whichever is less): visit #10 or  (date)     Recertification due (POC duration/ or 90 days whichever is less): visit # or 12 weeks 10/19/22 (date)     Visit # Insurance Allowable Auth required? Date Range   2 60 []  Yes  [x]  No        Units approved Units used Date Range          Latex Allergy:  [x]NO      []YES  Preferred Language for Healthcare:   [x]English       []other:    Functional Scale:           Date assessed:    TO physical FS primary measure score 60/100; risk adjusted 60/100 8/10/22    Pain level:  8/10 pre-session, 5/10 post session     SUBJECTIVE:  Pt reports no changes to his symptoms since initial eval. He denies problems or questions with HEP    OBJECTIVE: See eval. Responded well to manual traction. Observation:   Test measurements:  Neck ext and Right rotation increase UE /neck pain. RESTRICTIONS/PRECAUTIONS: HTN, lumbar disc disorder, NEEDS INTERPRETATION SERVICES for [place].     Exercises/Interventions:   Therapeutic Exercise (26523) Sets/Reps Notes   Supine chin tucks on 1 pillow  5\" 2x10 reps HEP, form cues needed   No money  5\" X10 reps HEP, cue to decr UT activation UT stretches to L side 30\"x5 reps HEP   TB row Green 2x10 HEP   Wall single arm pec S 3x30\" R HEP                       Therapeutic Activities (03847)                                   NMR re-education (00292)     Patient education with positioning pillows in sidelying or supine to avoid neck pain/UE pain, HEP instruction, avoidance of neck ext and proper posture, centralization of symptoms x8'                        Manual Intervention (92289)     Cerv mobs L lat glides    Thoracic mobs/manip     CT manip     Rib mobilizations     STM R>L UT/scalenes/PSM, UT S x10'    Manual traction in supine 1 pillow X8' intermittent pulls Responded well. Modalities: Consider mechanical traction N.V. Patient education:  -pt educated on diagnosis, prognosis and expectations for rehab  -all pt questions were answered    Home Exercise Program:  HEP instruction: Written HEP instructions provided and reviewed. Access Code: [email or web address]  URL: [email or web address]  Date: 08/10/2022  Prepared by: Kaykay Bisbee    Exercises  Supine Chin Tuck - 2 x daily - 7 x weekly - 2 sets - 10 reps - 5 hold  Shoulder External Rotation and Scapular Retraction with Resistance - 2 x daily - 7 x weekly - 2 sets - 10 reps - 5 hold  Seated Upper Trapezius Stretch - 2 x daily - 7 x weekly - 1-2 sets - 5 reps - 30 hold  Single Arm Doorway Pec Stretch at 90 Degrees Abduction - 1 x daily - 7 x weekly - 3 sets - 30 seconds hold  Standing Shoulder Row with Anchored Resistance - 1 x daily - 7 x weekly - 3 sets - 10 reps    Therapeutic Exercise and NMR EXR  [x] (35249) Provided verbal/tactile cueing for activities related to strengthening, flexibility, endurance, ROM  for improvements in cervical, postural, scapular, scapulothoracic and UE control with self care, reaching, carrying, lifting, house/yardwork, driving/computer work.     [x] (07757) Provided verbal/tactile cueing for activities related to improving balance, coordination, kinesthetic sense, posture, motor skill, proprioception  to assist with cervical, scapular, scapulothoracic and UE control with self care, reaching, carrying, lifting, house/yardwork, driving/computer work.  [] (72385) Therapist is in constant attendance of 2 or more patients providing skilled therapy interventions, but not providing any significant amount of measurable one-on-one time to either patient, for improvements in cervical, scapular, scapulothoracic and UE control with self care, reaching, carrying, lifting, house/yardwork, driving, computer work. Therapeutic Activities:    [] (82539 or 07465) Provided verbal/tactile cueing for activities related to improving balance, coordination, kinesthetic sense, posture, motor skill, proprioception and motor activation to allow for proper function of cervical, scapular, scapulothoracic and UE control with self care, carrying, lifting, driving/computer work.      Home Exercise Program:    [x] (05588) Reviewed/Progressed HEP activities related to strengthening, flexibility, endurance, ROM of cervical, scapular, scapulothoracic and UE control with self care, reaching, carrying, lifting, house/yardwork, driving/computer work  [] (07687) Reviewed/Progressed HEP activities related to improving balance, coordination, kinesthetic sense, posture, motor skill, proprioception of cervical, scapular, scapulothoracic and UE control with self care, reaching, carrying, lifting, house/yardwork, driving/computer work      Manual Treatments:  PROM / STM / Oscillations-Mobs:  G-I, II, III, IV (PA's, Inf., Post.)  [x] (33378) Provided manual therapy to mobilize soft tissue/joints of cervical/CT, scapular GHJ and UE for the purpose of decreasing headache, modulating pain, promoting relaxation,  increasing ROM, reducing/eliminating soft tissue swelling/inflammation/restriction, improving soft tissue extensibility and allowing for proper ROM for normal function with self care, reaching, carrying, lifting, house/yardwork, driving/computer work    Charges:  Timed Code Treatment Minutes: 40'   Total Treatment Minutes: 45' completing FOTO       [] EVAL - LOW (04342)   [] EVAL - MOD (05222)  [] EVAL - HIGH (70159)  [] RE-EVAL (62190)  [x] HF(74534) x  1     [] Ionto  [x] NMR (75529) x 1      [] Vaso  [x] Manual (60770) x   1    [] Ultrasound  [] TA x        [] Mech Traction (17821)  [] Aquatic Therapy x     [] ES (un) (50663):   [] Home Management Training x  [] ES(attended) (72941)   [] Dry Needling 1-2 muscles (29516):  [] Dry Needling 3+ muscles (266003  [] Group:      [] Other:     GOALS: (cut and paste from eval)  Patient stated goal: Abolish pain and cramping sensation in neck/UE  [] Progressing: [] Met: [] Not Met: [] Adjusted     Therapist goals for Patient:  Short Term Goals: To be achieved in: 2 weeks  1. Independent in HEP and progression per patient tolerance, in order to prevent re-injury. [] Progressing: [] Met: [] Not Met: [] Adjusted  2. Patient will have a decrease in pain to facilitate improvement in movement, function, and ADLs as indicated by Functional Deficits. [] Progressing: [] Met: [] Not Met: [] Adjusted     Long Term Goals: To be achieved in: 12 weeks  1. FOTO score of at least TBD to assist with reaching prior level of function. [] Progressing: [] Met: [] Not Met: [] Adjusted  2. Patient will demonstrate increased AROM to [facility] of cervical/thoracic spine to allow for proper joint functioning as indicated by patients Functional Deficits. [] Progressing: [] Met: [] Not Met: [] Adjusted  3. Patient will demonstrate an increase in postural awareness and control and activation of  Deep cervical stabilizers to allow for proper functional mobility as indicated by patients Functional Deficits. [] Progressing: [] Met: [] Not Met: [] Adjusted  4. Patient will return to functional activities including looking up with head without increased symptoms or restriction.   [] Progressing: []

## 2022-08-15 ENCOUNTER — HOSPITAL ENCOUNTER (OUTPATIENT)
Dept: PHYSICAL THERAPY | Age: 47
Setting detail: THERAPIES SERIES
Discharge: HOME | End: 2022-08-15
Payer: COMMERCIAL

## 2022-08-15 PROCEDURE — 97112 NEUROMUSCULAR REEDUCATION: CPT | Performed by: PHYSICAL THERAPIST

## 2022-08-15 PROCEDURE — 97110 THERAPEUTIC EXERCISES: CPT | Performed by: PHYSICAL THERAPIST

## 2022-08-15 PROCEDURE — 97140 MANUAL THERAPY 1/> REGIONS: CPT | Performed by: PHYSICAL THERAPIST

## 2022-08-15 NOTE — FLOWSHEET NOTE
needed  Form cues   No money  Orange 5\" 2X10 reps HEP, cue to decr UT activation   UT stretches to L side 30\"x5 reps HEP   TB row  TB shoulder ext Green 2x10  Green x10 HEP  Difficulty relaxing UT   Wall single arm pec S HEP   SB semi prone @ EOB w/ shoulder ext 2x10 Form/head cues                  Therapeutic Activities (93929)                                   NMR re-education (62458)     Patient education with, HEP instruction, avoidance of neck ext and proper posture, centralization of symptoms x6'                        Manual Intervention (87035)     Cerv mobs L lat glides x2'    Thoracic mobs/manip     CT manip     Rib mobilizations     STM R>L UT/scalenes/PSM, , TPR R UT x15' 10' beginning and 5' end of session   Manual traction in supine 1 pillow X8' intermittent pulls Responded well. Modalities:     Patient education:  -pt educated on diagnosis, prognosis and expectations for rehab  -all pt questions were answered    Home Exercise Program:  HEP instruction: Written HEP instructions provided and reviewed. Access Code: [email or web address]  URL: [email or web address]  Date: 08/10/2022  Prepared by: Faviola Randle    Exercises  Supine Chin Tuck - 2 x daily - 7 x weekly - 2 sets - 10 reps - 5 hold  Shoulder External Rotation and Scapular Retraction with Resistance - 2 x daily - 7 x weekly - 2 sets - 10 reps - 5 hold  Seated Upper Trapezius Stretch - 2 x daily - 7 x weekly - 1-2 sets - 5 reps - 30 hold  Single Arm Doorway Pec Stretch at 90 Degrees Abduction - 1 x daily - 7 x weekly - 3 sets - 30 seconds hold  Standing Shoulder Row with Anchored Resistance - 1 x daily - 7 x weekly - 3 sets - 10 reps    Therapeutic Exercise and NMR EXR  [x] (51731) Provided verbal/tactile cueing for activities related to strengthening, flexibility, endurance, ROM  for improvements in cervical, postural, scapular, scapulothoracic and UE control with self care, reaching, carrying, lifting, house/yardwork, driving/computer work.    [x] (04595) Provided verbal/tactile cueing for activities related to improving balance, coordination, kinesthetic sense, posture, motor skill, proprioception  to assist with cervical, scapular, scapulothoracic and UE control with self care, reaching, carrying, lifting, house/yardwork, driving/computer work.  [] (58177) Therapist is in constant attendance of 2 or more patients providing skilled therapy interventions, but not providing any significant amount of measurable one-on-one time to either patient, for improvements in cervical, scapular, scapulothoracic and UE control with self care, reaching, carrying, lifting, house/yardwork, driving, computer work. Therapeutic Activities:    [] (55347 or 54425) Provided verbal/tactile cueing for activities related to improving balance, coordination, kinesthetic sense, posture, motor skill, proprioception and motor activation to allow for proper function of cervical, scapular, scapulothoracic and UE control with self care, carrying, lifting, driving/computer work.      Home Exercise Program:    [x] (41054) Reviewed/Progressed HEP activities related to strengthening, flexibility, endurance, ROM of cervical, scapular, scapulothoracic and UE control with self care, reaching, carrying, lifting, house/yardwork, driving/computer work  [] (51788) Reviewed/Progressed HEP activities related to improving balance, coordination, kinesthetic sense, posture, motor skill, proprioception of cervical, scapular, scapulothoracic and UE control with self care, reaching, carrying, lifting, house/yardwork, driving/computer work      Manual Treatments:  PROM / STM / Oscillations-Mobs:  G-I, II, III, IV (PA's, Inf., Post.)  [x] (53762) Provided manual therapy to mobilize soft tissue/joints of cervical/CT, scapular GHJ and UE for the purpose of decreasing headache, modulating pain, promoting relaxation,  increasing ROM, reducing/eliminating soft tissue swelling/inflammation/restriction, improving soft tissue extensibility and allowing for proper ROM for normal function with self care, reaching, carrying, lifting, house/yardwork, driving/computer work    Charges:  Timed Code Treatment Minutes: 40'   Total Treatment Minutes: 40'        [] EVAL - LOW (60137)   [] EVAL - MOD (07543)  [] EVAL - HIGH (71775)  [] RE-EVAL (45018)  [x] BF(43552) x  1     [] Ionto  [x] NMR (82920) x 1      [] Vaso  [x] Manual (58464) x   1    [] Ultrasound  [] TA x        [] Mech Traction (31329)  [] Aquatic Therapy x     [] ES (un) (20386):   [] Home Management Training x  [] ES(attended) (67459)   [] Dry Needling 1-2 muscles (17419):  [] Dry Needling 3+ muscles (038573  [] Group:      [] Other:     GOALS: (cut and paste from eval)  Patient stated goal: Abolish pain and cramping sensation in neck/UE  [] Progressing: [] Met: [] Not Met: [] Adjusted     Therapist goals for Patient:  Short Term Goals: To be achieved in: 2 weeks  1. Independent in HEP and progression per patient tolerance, in order to prevent re-injury. [] Progressing: [x] Met: [] Not Met: [] Adjusted  2. Patient will have a decrease in pain to facilitate improvement in movement, function, and ADLs as indicated by Functional Deficits. [] Progressing: [x] Met: [] Not Met: [] Adjusted     Long Term Goals: To be achieved in: 12 weeks  1. FOTO score of at least TBD to assist with reaching prior level of function. [] Progressing: [] Met: [] Not Met: [] Adjusted  2. Patient will demonstrate increased AROM to [facility] of cervical/thoracic spine to allow for proper joint functioning as indicated by patients Functional Deficits. [] Progressing: [] Met: [] Not Met: [] Adjusted  3. Patient will demonstrate an increase in postural awareness and control and activation of  Deep cervical stabilizers to allow for proper functional mobility as indicated by patients Functional Deficits. [] Progressing: [] Met: [] Not Met: [] Adjusted  4.  Patient will return to functional activities including looking up with head without increased symptoms or restriction. [] Progressing: [] Met: [] Not Met: [] Adjusted  5. Patient to be able to reach overhead and behind back without radicular pain (patient specific functional goal)    [] Progressing: [] Met: [] Not Met: [] Adjusted          Overall Progression Towards Functional goals/ Treatment Progress Update:  [] Patient is progressing as expected towards functional goals listed. [] Progression is slowed due to complexities/Impairments listed. [] Progression has been slowed due to co-morbidities. [x] Plan just implemented, too soon to assess goals progression <30days   [] Goals require adjustment due to lack of progress  [] Patient is not progressing as expected and requires additional follow up with physician  [] Other    Persisting Functional Limitations/Impairments:  [x]Sitting []Standing   []Walking []Squatting/bending    []Stairs []ADL's    []Transfers [x]Reaching  [x]Housework [x]Lifting  [x]Driving []Job related tasks  []Sports/Recreation  [x]Sleeping  []Other:    ASSESSMENT:  pt continues to require frequent cues for posture and scap positioning, but able to increase seated and standing posture exercises with minimal increased UT soreness, no radic and this improved with STW repeated end of session. Treatment/Activity Tolerance:  [x] Patient able to complete tx  [] Patient limited by fatique  [] Patient limited by pain   [] Patient limited by other medical complications  [] Other:     Prognosis: [x] Good [] Fair  [] Poor    Patient Requires Follow-up: [x] Yes  [] No    PLAN: See eval. PT 2x / week for 12 weeks.    [x] Continue per plan of care [] Alter current plan (see comments)  [] Plan of care initiated [] Hold pending MD visit [] Discharge    Electronically signed by: Sarkis Monterroso, PT  OMT-C      Note: If patient does not return for scheduled/ recommended follow up visits, this note will serve as a discharge from care along with most recent update on progress.

## 2022-08-17 ENCOUNTER — HOSPITAL ENCOUNTER (OUTPATIENT)
Dept: PHYSICAL THERAPY | Age: 47
Setting detail: THERAPIES SERIES
Discharge: HOME | End: 2022-08-17
Payer: COMMERCIAL

## 2022-08-17 PROCEDURE — 97140 MANUAL THERAPY 1/> REGIONS: CPT | Performed by: PHYSICAL THERAPIST

## 2022-08-17 PROCEDURE — 97110 THERAPEUTIC EXERCISES: CPT | Performed by: PHYSICAL THERAPIST

## 2022-08-17 PROCEDURE — 97112 NEUROMUSCULAR REEDUCATION: CPT | Performed by: PHYSICAL THERAPIST

## 2022-08-17 NOTE — FLOWSHEET NOTE
[address] [facility]  Phone: [phone]   Fax: [phone]    Physical Therapy Treatment Note/ Progress Report:     Date:  2022    Patient Name:  Richelle Mcknight    :  1975  MRN: [id number]  Restrictions/Precautions:    Medical/Treatment Diagnosis Information:  Diagnosis: M43,02 cervical spondylosis  Treatment Diagnosis: M43,02 cervical spondylosis, M54.2 Cervicalgia  Insurance/Certification information:  PT Insurance Information: [facility] [place] /60 visits/ No auth  Physician Information:  Axel Ellsworth MD    Plan of care signed (Y/N): []  Yes [x]  No     Date of Patient follow up with Physician: ?     Progress Report: []  Yes  [x]  No     Date Range for reporting period:  Beginnin22  Ending:     Progress report due (10 Rx/or 30 days whichever is less): visit #10 or  (date)     Recertification due (POC duration/ or 90 days whichever is less): visit # or 12 weeks 10/19/22 (date)     Visit # Insurance Allowable Auth required? Date Range   4 60 []  Yes  [x]  No        Units approved Units used Date Range          Latex Allergy:  [x]NO      []YES  Preferred Language for Healthcare:   [x]English       []other:    Functional Scale:           Date assessed:    FOTO physical FS primary measure score 60/100; risk adjusted 60/100 8/10/22    Pain level:  \"low\"/10 pre-session    SUBJECTIVE:  Pt reports he continues to feel better following each session. He denies radiating pain down the arm. Pain is now localized to the UT and R c-spine    OBJECTIVE: See eval. Responded well to manual traction. Observation:   Test measurements:  assess ROM NV    RESTRICTIONS/PRECAUTIONS: HTN, lumbar disc disorder, NEEDS INTERPRETATION SERVICES for Portuguese.     Exercises/Interventions:   Therapeutic Exercise (20983) Sets/Reps Notes   Supine chin tucks on 1 pillow   Seated chin tucks 3\" x10 HEP, less form cues needed  Form cues   No money   HAB Orange 5\" 2X10 reps  Orange 2x10 HEP, cue to decr UT activation   UT stretches to L side 30\"x5 reps HEP   TB row  TB shoulder ext Green 2x10  Green x10 HEP  VTC's   Wall single arm pec S 3x30\" R HEP   SB semi prone @ EOB w/ shoulder ext Form/head cues   Seated I, T 3# x10 ea    Table push up SA control 1/2 range 2x10         Therapeutic Activities (03500)                                   NMR re-education (00835)     Patient education with,                        Manual Intervention (84611)     Cerv mobs L lat glides , PA's x3'    Thoracic mobs/manip     CT manip     Rib mobilizations     STM R>L UT/scalenes/PSM, , TPR R UT x12'    Manual traction in supine 1 pillow X6' intermittent pulls Responded well. Modalities: declined    Patient education:  -pt educated on diagnosis, prognosis and expectations for rehab  -all pt questions were answered    Home Exercise Program:  HEP instruction: Written HEP instructions provided and reviewed. Access Code: [email or web address]  URL: [email or web address]  Date: 08/10/2022  Prepared by: Burak Melissa    Exercises  Supine Chin Tuck - 2 x daily - 7 x weekly - 2 sets - 10 reps - 5 hold  Shoulder External Rotation and Scapular Retraction with Resistance - 2 x daily - 7 x weekly - 2 sets - 10 reps - 5 hold  Seated Upper Trapezius Stretch - 2 x daily - 7 x weekly - 1-2 sets - 5 reps - 30 hold  Single Arm Doorway Pec Stretch at 90 Degrees Abduction - 1 x daily - 7 x weekly - 3 sets - 30 seconds hold  Standing Shoulder Row with Anchored Resistance - 1 x daily - 7 x weekly - 3 sets - 10 reps    Therapeutic Exercise and NMR EXR  [x] (87380) Provided verbal/tactile cueing for activities related to strengthening, flexibility, endurance, ROM  for improvements in cervical, postural, scapular, scapulothoracic and UE control with self care, reaching, carrying, lifting, house/yardwork, driving/computer work.     [x] (32873) Provided verbal/tactile cueing for activities related to improving balance, coordination, kinesthetic sense, posture, motor skill, proprioception  to assist with cervical, scapular, scapulothoracic and UE control with self care, reaching, carrying, lifting, house/yardwork, driving/computer work.  [] (52213) Therapist is in constant attendance of 2 or more patients providing skilled therapy interventions, but not providing any significant amount of measurable one-on-one time to either patient, for improvements in cervical, scapular, scapulothoracic and UE control with self care, reaching, carrying, lifting, house/yardwork, driving, computer work. Therapeutic Activities:    [] (32679 or 67464) Provided verbal/tactile cueing for activities related to improving balance, coordination, kinesthetic sense, posture, motor skill, proprioception and motor activation to allow for proper function of cervical, scapular, scapulothoracic and UE control with self care, carrying, lifting, driving/computer work.      Home Exercise Program:    [x] (53362) Reviewed/Progressed HEP activities related to strengthening, flexibility, endurance, ROM of cervical, scapular, scapulothoracic and UE control with self care, reaching, carrying, lifting, house/yardwork, driving/computer work  [] (54815) Reviewed/Progressed HEP activities related to improving balance, coordination, kinesthetic sense, posture, motor skill, proprioception of cervical, scapular, scapulothoracic and UE control with self care, reaching, carrying, lifting, house/yardwork, driving/computer work      Manual Treatments:  PROM / STM / Oscillations-Mobs:  G-I, II, III, IV (PA's, Inf., Post.)  [x] (98072) Provided manual therapy to mobilize soft tissue/joints of cervical/CT, scapular GHJ and UE for the purpose of decreasing headache, modulating pain, promoting relaxation,  increasing ROM, reducing/eliminating soft tissue swelling/inflammation/restriction, improving soft tissue extensibility and allowing for proper ROM for normal function with self care, reaching, carrying, lifting, house/yardwork, driving/computer work    Charges:  Timed Code Treatment Minutes: 40'   Total Treatment Minutes: 40'        [] EVAL - LOW (53459)   [] EVAL - MOD (83047)  [] EVAL - HIGH (29879)  [] RE-EVAL (70579)  [x] CM(64209) x  1     [] Ionto  [x] NMR (28801) x 1      [] Vaso  [x] Manual (13109) x   1    [] Ultrasound  [] TA x        [] Mech Traction (95910)  [] Aquatic Therapy x     [] ES (un) (56728):   [] Home Management Training x  [] ES(attended) (23792)   [] Dry Needling 1-2 muscles (43436):  [] Dry Needling 3+ muscles (470975  [] Group:      [] Other:     GOALS: (cut and paste from eval)  Patient stated goal: Abolish pain and cramping sensation in neck/UE  [] Progressing: [] Met: [] Not Met: [] Adjusted     Therapist goals for Patient:  Short Term Goals: To be achieved in: 2 weeks  1. Independent in HEP and progression per patient tolerance, in order to prevent re-injury. [] Progressing: [x] Met: [] Not Met: [] Adjusted  2. Patient will have a decrease in pain to facilitate improvement in movement, function, and ADLs as indicated by Functional Deficits. [] Progressing: [x] Met: [] Not Met: [] Adjusted     Long Term Goals: To be achieved in: 12 weeks  1. FOTO score of at least TBD to assist with reaching prior level of function. [] Progressing: [] Met: [] Not Met: [] Adjusted  2. Patient will demonstrate increased AROM to [facility] of cervical/thoracic spine to allow for proper joint functioning as indicated by patients Functional Deficits. [] Progressing: [] Met: [] Not Met: [] Adjusted  3. Patient will demonstrate an increase in postural awareness and control and activation of  Deep cervical stabilizers to allow for proper functional mobility as indicated by patients Functional Deficits. [] Progressing: [] Met: [] Not Met: [] Adjusted  4. Patient will return to functional activities including looking up with head without increased symptoms or restriction.   [] Progressing: [] Met: [] Not Met: [] Adjusted  5. Patient to be able to reach overhead and behind back without radicular pain (patient specific functional goal)    [] Progressing: [] Met: [] Not Met: [] Adjusted          Overall Progression Towards Functional goals/ Treatment Progress Update:  [] Patient is progressing as expected towards functional goals listed. [] Progression is slowed due to complexities/Impairments listed. [] Progression has been slowed due to co-morbidities. [x] Plan just implemented, too soon to assess goals progression <30days   [] Goals require adjustment due to lack of progress  [] Patient is not progressing as expected and requires additional follow up with physician  [] Other    Persisting Functional Limitations/Impairments:  [x]Sitting []Standing   []Walking []Squatting/bending    []Stairs []ADL's    []Transfers [x]Reaching  [x]Housework [x]Lifting  [x]Driving []Job related tasks  []Sports/Recreation  [x]Sleeping  []Other:    ASSESSMENT:  able to progress functional UE strength and and posture exercises with less reported R UT tightness. Continued VTC's needed for scap control. Treatment/Activity Tolerance:  [x] Patient able to complete tx  [] Patient limited by fatique  [] Patient limited by pain   [] Patient limited by other medical complications  [] Other:     Prognosis: [x] Good [] Fair  [] Poor    Patient Requires Follow-up: [x] Yes  [] No    PLAN: See jorge. PT 2x / week for 12 weeks. [x] Continue per plan of care [] Alter current plan (see comments)  [] Plan of care initiated [] Hold pending MD visit [] Discharge    Electronically signed by: Serenity Carrasco PT  OMT-C      Note: If patient does not return for scheduled/ recommended follow up visits, this note will serve as a discharge from care along with most recent update on progress.

## 2022-08-22 ENCOUNTER — HOSPITAL ENCOUNTER (OUTPATIENT)
Dept: PHYSICAL THERAPY | Age: 47
Setting detail: THERAPIES SERIES
Discharge: HOME | End: 2022-08-22
Payer: COMMERCIAL

## 2022-08-22 PROCEDURE — 97140 MANUAL THERAPY 1/> REGIONS: CPT | Performed by: PHYSICAL THERAPIST

## 2022-08-22 PROCEDURE — 97110 THERAPEUTIC EXERCISES: CPT | Performed by: PHYSICAL THERAPIST

## 2022-08-22 NOTE — FLOWSHEET NOTE
[address] [place] [address]  Phone: [phone]   Fax: [phone]    Physical Therapy Treatment Note/ Progress Report:     Date:  2022    Patient Name:  Benita Orourke    :  1975  MRN: [id number]  Restrictions/Precautions:    Medical/Treatment Diagnosis Information:  Diagnosis: M43,02 cervical spondylosis  Treatment Diagnosis: M43,02 cervical spondylosis, M54.2 Cervicalgia  Insurance/Certification information:  PT Insurance Information: Herlinda Suero Grad /60 visits/ No auth  Physician Information:  Audley Merlin MD    Plan of care signed (Y/N): []  Yes [x]  No     Date of Patient follow up with Physician: ?     Progress Report: []  Yes  [x]  No     Date Range for reporting period:  Beginnin22  Ending:     Progress report due (10 Rx/or 30 days whichever is less): visit #10 or  (date)     Recertification due (POC duration/ or 90 days whichever is less): visit # or 12 weeks 10/19/22 (date)     Visit # Insurance Allowable Auth required? Date Range   5 60 []  Yes  [x]  No 22-       Units approved Units used Date Range          Latex Allergy:  [x]NO      []YES  Preferred Language for Healthcare:   [x]English       []other:    Functional Scale:           Date assessed:    TO physical FS primary measure score 60/100; risk adjusted 60/100 8/10/22    Pain level:  \"low\"/10 pre-session    SUBJECTIVE:  Pt reports he didn't have as good of a day  evening, but was sitting more than usual watching a movie. Overall his pain is more localized than since starting PT.    OBJECTIVE: See eval. Responded well to manual traction. Observation:   Test measurements:  ROM cervical flex 45, ext 25 w/ pain base of neck/R UT only, SB 30 deg B but stretch pain on R UT w/ L SB    RESTRICTIONS/PRECAUTIONS: HTN, lumbar disc disorder, NEEDS INTERPRETATION SERVICES for [place].     Exercises/Interventions:   Therapeutic Exercise (09900) Sets/Reps Notes   Supine chin tucks on 1 pillow   Seated chin tucks 3\" x10 HEP, less form cues needed  Form cues   No money   HAB Green  5\" 2X10 reps  green 2x10 HEP, cue to decr UT activation   UT stretches to L side 30\"x5 reps HEP   TB row  TB shoulder ext HEP, gave Blue TB 8/22/22  VTC's   Wall single arm pec S 3x30\" R HEP   SB semi prone @ EOB w/ shoulder ext Form/head cues   Seated I, T 3# x10 ea    Table push up SA control 2x10    TB SA wall slide Green single 2x10                   Therapeutic Activities (58888)                                   NMR re-education (47772)     Patient education with,                        Manual Intervention (00493)     Cerv mobs L lat glides x1'    Thoracic mobs/manip     CT manip     Rib mobilizations     STM R>L UT/scalenes/PSM, , TPR R UT x12'    Manual traction in supine 1 pillow X6' intermittent pulls Responded well. Prone upper thoracic PA's, mid thoracic rotation mobs, C5-7 PA's all gr lll x5' Use pillow for lumbar support NV            Modalities: declined    Patient education:  -pt educated on diagnosis, prognosis and expectations for rehab  -all pt questions were answered    Home Exercise Program:  HEP instruction: Written HEP instructions provided and reviewed. Access Code: [email or web address]  URL: [email or web address]  Date: 08/10/2022  Prepared by: Serenity Carrasco    Exercises  Supine Chin Tuck - 2 x daily - 7 x weekly - 2 sets - 10 reps - 5 hold  Shoulder External Rotation and Scapular Retraction with Resistance - 2 x daily - 7 x weekly - 2 sets - 10 reps - 5 hold  Seated Upper Trapezius Stretch - 2 x daily - 7 x weekly - 1-2 sets - 5 reps - 30 hold  Single Arm Doorway Pec Stretch at 90 Degrees Abduction - 1 x daily - 7 x weekly - 3 sets - 30 seconds hold  Standing Shoulder Row with Anchored Resistance - 1 x daily - 7 x weekly - 3 sets - 10 reps    Therapeutic Exercise and NMR EXR  [x] (18224) Provided verbal/tactile cueing for activities related to strengthening, flexibility, GHJ and UE for the purpose of decreasing headache, modulating pain, promoting relaxation,  increasing ROM, reducing/eliminating soft tissue swelling/inflammation/restriction, improving soft tissue extensibility and allowing for proper ROM for normal function with self care, reaching, carrying, lifting, house/yardwork, driving/computer work    Charges:  Timed Code Treatment Minutes: 40'   Total Treatment Minutes: 40'        [] EVAL - LOW (68057)   [] EVAL - MOD (34052)  [] EVAL - HIGH (92361)  [] RE-EVAL (16033)  [x] JL(38719) x  1     [] Ionto  [x] NMR (25960) x 1      [] Vaso  [x] Manual (62575) x   1    [] Ultrasound  [] TA x        [] Mech Traction (08141)  [] Aquatic Therapy x     [] ES (un) (58919):   [] Home Management Training x  [] ES(attended) (21030)   [] Dry Needling 1-2 muscles (24712):  [] Dry Needling 3+ muscles (595496  [] Group:      [] Other:     GOALS: (cut and paste from eval)  Patient stated goal: Abolish pain and cramping sensation in neck/UE  [] Progressing: [] Met: [] Not Met: [] Adjusted     Therapist goals for Patient:  Short Term Goals: To be achieved in: 2 weeks  1. Independent in HEP and progression per patient tolerance, in order to prevent re-injury. [] Progressing: [x] Met: [] Not Met: [] Adjusted  2. Patient will have a decrease in pain to facilitate improvement in movement, function, and ADLs as indicated by Functional Deficits. [] Progressing: [x] Met: [] Not Met: [] Adjusted     Long Term Goals: To be achieved in: 12 weeks  1. FOTO score of at least TBD to assist with reaching prior level of function. [] Progressing: [] Met: [] Not Met: [] Adjusted  2. Patient will demonstrate increased AROM to [facility] of cervical/thoracic spine to allow for proper joint functioning as indicated by patients Functional Deficits. [] Progressing: [] Met: [] Not Met: [] Adjusted  3.  Patient will demonstrate an increase in postural awareness and control and activation of  Deep cervical stabilizers to allow for proper functional mobility as indicated by patients Functional Deficits. [] Progressing: [] Met: [] Not Met: [] Adjusted  4. Patient will return to functional activities including looking up with head without increased symptoms or restriction. [] Progressing: [] Met: [] Not Met: [] Adjusted  5. Patient to be able to reach overhead and behind back without radicular pain (patient specific functional goal)    [] Progressing: [] Met: [] Not Met: [] Adjusted          Overall Progression Towards Functional goals/ Treatment Progress Update:  [] Patient is progressing as expected towards functional goals listed. [] Progression is slowed due to complexities/Impairments listed. [] Progression has been slowed due to co-morbidities. [x] Plan just implemented, too soon to assess goals progression <30days   [] Goals require adjustment due to lack of progress  [] Patient is not progressing as expected and requires additional follow up with physician  [] Other    Persisting Functional Limitations/Impairments:  [x]Sitting []Standing   []Walking []Squatting/bending    []Stairs []ADL's    []Transfers [x]Reaching  [x]Housework [x]Lifting  [x]Driving []Job related tasks  []Sports/Recreation  [x]Sleeping  []Other:    ASSESSMENT:  improving cervical AROM with less radicular pain and improved further following addition of prone mobs. Given blue TB for progression of HEP. Treatment/Activity Tolerance:  [x] Patient able to complete tx  [] Patient limited by fatique  [] Patient limited by pain   [] Patient limited by other medical complications  [] Other:     Prognosis: [x] Good [] Fair  [] Poor    Patient Requires Follow-up: [x] Yes  [] No    PLAN: See eval. PT 2x / week for 12 weeks.    [x] Continue per plan of care [] Alter current plan (see comments)  [] Plan of care initiated [] Hold pending MD visit [] Discharge    Electronically signed by: Nika Silveira PT  OMT-C      Note: If patient does not return for scheduled/ recommended follow up visits, this note will serve as a discharge from care along with most recent update on progress.

## 2022-08-24 ENCOUNTER — HOSPITAL ENCOUNTER (OUTPATIENT)
Dept: PHYSICAL THERAPY | Age: 47
Setting detail: THERAPIES SERIES
Discharge: HOME | End: 2022-08-24
Payer: COMMERCIAL

## 2022-08-24 PROCEDURE — 97110 THERAPEUTIC EXERCISES: CPT | Performed by: PHYSICAL THERAPIST

## 2022-08-24 PROCEDURE — 97140 MANUAL THERAPY 1/> REGIONS: CPT | Performed by: PHYSICAL THERAPIST

## 2022-08-24 NOTE — FLOWSHEET NOTE
@ EOB w/ shoulder ext Form/head cues   Seated I, T 3# 2x10 ea    Table push up SA control 2x10 Cues with fatigue B   TB SA wall slide blue single 2x10 HEP   Seated chin tuck w/ TB Orange x10 Heavy VTC's, difficulty w/ proper form, D/C             Therapeutic Activities (02937)                                   NMR re-education (00029)     Patient education with, HEP posture,x3'                        Manual Intervention (70519)     Cerv mobs '    Thoracic mobs/manip     CT manip     Rib mobilizations     STM R>L UT/scalenes/PSM, , TPR R UT x15'    Manual traction in supine 1 pillow X6' intermittent pulls Responded well. Prone upper thoracic PA's, mid thoracic rotation mobs, C5-7 PA's all gr lll x5' Use pillow for lumbar support NV prn            Modalities: declined    Patient education:  -pt educated on diagnosis, prognosis and expectations for rehab  -all pt questions were answered    Home Exercise Program:  HEP instruction: Written HEP instructions provided and reviewed. Access Code: [email or web address]  URL: [email or web address]  Date: 08/24/2022  Prepared by: Ed Garcia    Exercises  Supine Chin Tuck - 2 x daily - 7 x weekly - 2 sets - 10 reps - 5 hold  Shoulder External Rotation and Scapular Retraction with Resistance - 2 x daily - 7 x weekly - 2 sets - 10 reps - 5 hold  Seated Upper Trapezius Stretch - 2 x daily - 7 x weekly - 1-2 sets - 5 reps - 30 hold  Single Arm Doorway Pec Stretch at 90 Degrees Abduction - 1 x daily - 7 x weekly - 3 sets - 30 seconds hold  Standing Shoulder Row with Anchored Resistance - 1 x daily - 7 x weekly - 3 sets - 10 reps  Seated Shoulder Horizontal Abduction with Resistance - 1 x daily - 7 x weekly - 2 sets - 15 reps  Shoulder Flexion Wall Slide with Resistance Band - 1 x daily - 7 x weekly - 2 sets - 15 reps    Therapeutic Exercise and NMR EXR  [x] (64254) Provided verbal/tactile cueing for activities related to strengthening, flexibility, endurance, ROM  for improvements in cervical, postural, scapular, scapulothoracic and UE control with self care, reaching, carrying, lifting, house/yardwork, driving/computer work. [x] (76263) Provided verbal/tactile cueing for activities related to improving balance, coordination, kinesthetic sense, posture, motor skill, proprioception  to assist with cervical, scapular, scapulothoracic and UE control with self care, reaching, carrying, lifting, house/yardwork, driving/computer work.  [] (82632) Therapist is in constant attendance of 2 or more patients providing skilled therapy interventions, but not providing any significant amount of measurable one-on-one time to either patient, for improvements in cervical, scapular, scapulothoracic and UE control with self care, reaching, carrying, lifting, house/yardwork, driving, computer work. Therapeutic Activities:    [] (89767 or 42390) Provided verbal/tactile cueing for activities related to improving balance, coordination, kinesthetic sense, posture, motor skill, proprioception and motor activation to allow for proper function of cervical, scapular, scapulothoracic and UE control with self care, carrying, lifting, driving/computer work.      Home Exercise Program:    [x] (91825) Reviewed/Progressed HEP activities related to strengthening, flexibility, endurance, ROM of cervical, scapular, scapulothoracic and UE control with self care, reaching, carrying, lifting, house/yardwork, driving/computer work  [] (35158) Reviewed/Progressed HEP activities related to improving balance, coordination, kinesthetic sense, posture, motor skill, proprioception of cervical, scapular, scapulothoracic and UE control with self care, reaching, carrying, lifting, house/yardwork, driving/computer work      Manual Treatments:  PROM / STM / Oscillations-Mobs:  G-I, II, III, IV (PA's, Inf., Post.)  [x] (98719) Provided manual therapy to mobilize soft tissue/joints of cervical/CT, scapular GHJ and UE for the purpose of decreasing headache, modulating pain, promoting relaxation,  increasing ROM, reducing/eliminating soft tissue swelling/inflammation/restriction, improving soft tissue extensibility and allowing for proper ROM for normal function with self care, reaching, carrying, lifting, house/yardwork, driving/computer work    Charges:  Timed Code Treatment Minutes: 42'   Total Treatment Minutes: 43'        [] EVAL - LOW (54443)   [] EVAL - MOD (03372)  [] EVAL - HIGH (71426)  [] RE-EVAL (69518)  [x] IP(44028) x  2    [] Ionto  [x] NMR (31059) x       [] Vaso  [x] Manual (64721) x   1    [] Ultrasound  [] TA x        [] Mech Traction (26387)  [] Aquatic Therapy x     [] ES (un) (51576):   [] Home Management Training x  [] ES(attended) (68740)   [] Dry Needling 1-2 muscles (95502):  [] Dry Needling 3+ muscles (151790  [] Group:      [] Other:     GOALS: (cut and paste from eval)  Patient stated goal: Abolish pain and cramping sensation in neck/UE  [] Progressing: [] Met: [] Not Met: [] Adjusted     Therapist goals for Patient:  Short Term Goals: To be achieved in: 2 weeks  1. Independent in HEP and progression per patient tolerance, in order to prevent re-injury. [] Progressing: [x] Met: [] Not Met: [] Adjusted  2. Patient will have a decrease in pain to facilitate improvement in movement, function, and ADLs as indicated by Functional Deficits. [] Progressing: [x] Met: [] Not Met: [] Adjusted     Long Term Goals: To be achieved in: 12 weeks  1. FOTO score of at least TBD to assist with reaching prior level of function. [] Progressing: [] Met: [] Not Met: [] Adjusted  2. Patient will demonstrate increased AROM to [facility] of cervical/thoracic spine to allow for proper joint functioning as indicated by patients Functional Deficits. [x] Progressing: [] Met: [] Not Met: [] Adjusted  3.  Patient will demonstrate an increase in postural awareness and control and activation of  Deep cervical stabilizers to allow for proper functional mobility as indicated by patients Functional Deficits. [] Progressing: [] Met: [] Not Met: [] Adjusted  4. Patient will return to functional activities including looking up with head without increased symptoms or restriction. [] Progressing: [] Met: [] Not Met: [] Adjusted  5. Patient to be able to reach overhead and behind back without radicular pain (patient specific functional goal)    [] Progressing: [] Met: [] Not Met: [] Adjusted          Overall Progression Towards Functional goals/ Treatment Progress Update:  [] Patient is progressing as expected towards functional goals listed. [] Progression is slowed due to complexities/Impairments listed. [] Progression has been slowed due to co-morbidities. [x] Plan just implemented, too soon to assess goals progression <30days   [] Goals require adjustment due to lack of progress  [] Patient is not progressing as expected and requires additional follow up with physician  [] Other    Persisting Functional Limitations/Impairments:  [x]Sitting []Standing   []Walking []Squatting/bending    []Stairs []ADL's    []Transfers [x]Reaching  [x]Housework [x]Lifting  [x]Driving []Job related tasks  []Sports/Recreation  [x]Sleeping  []Other:    ASSESSMENT:  decreased overall R UT tightness noted entering clinic. The was reported to tighten up a little following increased reps/resistance w/ TE, but resolved again with repeat 5' STM end of session. Provided pt w/ progressed HEP [facility] in [facility] [place] Treatment/Activity Tolerance:  [x] Patient able to complete tx  [] Patient limited by fatique  [] Patient limited by pain   [] Patient limited by other medical complications  [] Other:     Prognosis: [x] Good [] Fair  [] Poor    Patient Requires Follow-up: [x] Yes  [] No    PLAN: See eval. PT 2x / week for 12 weeks.    [x] Continue per plan of care [] Alter current plan (see comments)  [] Plan of care initiated [] Hold pending MD visit [] Discharge    Electronically signed by: Cindy Rodriguez PT  OMT-C      Note: If patient does not return for scheduled/ recommended follow up visits, this note will serve as a discharge from care along with most recent update on progress.

## 2022-08-29 ENCOUNTER — HOSPITAL ENCOUNTER (OUTPATIENT)
Dept: PHYSICAL THERAPY | Age: 47
Setting detail: THERAPIES SERIES
Discharge: HOME | End: 2022-08-29
Payer: COMMERCIAL

## 2022-08-29 PROCEDURE — 97140 MANUAL THERAPY 1/> REGIONS: CPT | Performed by: PHYSICAL THERAPIST

## 2022-08-29 PROCEDURE — 97110 THERAPEUTIC EXERCISES: CPT | Performed by: PHYSICAL THERAPIST

## 2022-08-29 NOTE — PROGRESS NOTES
[address] [place] [address]  Phone: [phone]   Fax: [phone]    Physical Therapy Treatment Note/ Progress Report:     Date:  2022    Patient Name:  Nuria Guthrie    :  1975  MRN: [id number]  Restrictions/Precautions:    Medical/Treatment Diagnosis Information:  Diagnosis: M43,02 cervical spondylosis  Treatment Diagnosis: M43,02 cervical spondylosis, M54.2 Cervicalgia  Insurance/Certification information:  PT Insurance Information: Saba Willett /60 visits/ No auth  Physician Information:  Wilmer Kirk MD    Plan of care signed (Y/N): []  Yes [x]  No     Date of Patient follow up with Physician: ?     Progress Report: []  Yes  [x]  No     Date Range for reporting period:  Beginnin22  Endin22    Progress report due (10 Rx/or 30 days whichever is less): visit #17 or 89/10/94 (date)     Recertification due (POC duration/ or 90 days whichever is less): visit # or 12 weeks 10/19/22 (date)     Visit # Insurance Allowable Auth required? Date Range   7 60 []  Yes  [x]  No 22-22       Units approved Units used Date Range          Latex Allergy:  [x]NO      []YES  Preferred Language for Healthcare:   [x]English       []other:    Functional Scale:           Date assessed:    FOTO physical FS primary measure score 60/100; risk adjusted 60/100 8/10/22    Pain level:  0/10 pre-session    SUBJECTIVE:  Pt reports he still has difficulty looking up and lifting overhead, but driving and seated postures are much more comfortable. OBJECTIVE: PN   Observation:   Test measurements:  ROM cervical flex 45, ext 35 w/ pain base of neck/R UT only, SB 30 deg B but stretch pain on R UT w/ L SB    RESTRICTIONS/PRECAUTIONS: HTN, lumbar disc disorder, NEEDS INTERPRETATION SERVICES for French.     Exercises/Interventions:   Therapeutic Exercise (59388) Sets/Reps Notes   Supine chin tucks on 1 pillow   Seated chin tucks  HEP, less form cues needed  Form cues   No money   HAB HEP  HEP   UT stretches to L side HEP   TB row  TB shoulder ext  TB PNF D2 flexion Green x20 R/L HEP, gave Blue TB 8/22/22  VTC's   Wall single arm pec S 3x30\" R HEP   SB semi prone @ EOB w/ shoulder ext Form/head cues   Seated I, T 3# 2x10 ea    Table push up SA control 2x10 Cues with fatigue B   TB SA wall slide HEP           Self thoracic ext over chair w/ head/neck supported 2 levels, x5 reps ea    Seated chin tuck w/ TB Heavy VTC's, difficulty w/ proper form, D/C   Prone HAB, B shoulder ext 0# 2x10 ea Scap cues        Therapeutic Activities (26771)                                   NMR re-education (08127)     Patient education with, HEP posture,x3'                        Manual Intervention (41751)     Cerv mobs '    Thoracic mobs/manip     CT manip     Rib mobilizations     STM R>L UT/scalenes/PSM, , TPR R UT x15'    Manual traction in supine 1 pillow X6' intermittent pulls Responded well. Prone upper thoracic PA's, mid thoracic rotation mobs, C5-7 PA's all gr lll x5' Use pillow for lumbar support NV prn            Modalities: declined    Patient education:  -pt educated on diagnosis, prognosis and expectations for rehab  -all pt questions were answered    Home Exercise Program:  HEP instruction: Written HEP instructions provided and reviewed. Access Code: [email or web address]  URL: [email or web address]  Date: 08/24/2022  Prepared by: Chago Woo    Exercises  Supine Chin Tuck - 2 x daily - 7 x weekly - 2 sets - 10 reps - 5 hold  Shoulder External Rotation and Scapular Retraction with Resistance - 2 x daily - 7 x weekly - 2 sets - 10 reps - 5 hold  Seated Upper Trapezius Stretch - 2 x daily - 7 x weekly - 1-2 sets - 5 reps - 30 hold  Single Arm Doorway Pec Stretch at 90 Degrees Abduction - 1 x daily - 7 x weekly - 3 sets - 30 seconds hold  Standing Shoulder Row with Anchored Resistance - 1 x daily - 7 x weekly - 3 sets - 10 reps  Seated Shoulder Horizontal Abduction scapulothoracic and UE control with self care, reaching, carrying, lifting, house/yardwork, driving/computer work      Manual Treatments:  PROM / STM / Oscillations-Mobs:  G-I, II, III, IV (PA's, Inf., Post.)  [x] (17612) Provided manual therapy to mobilize soft tissue/joints of cervical/CT, scapular GHJ and UE for the purpose of decreasing headache, modulating pain, promoting relaxation,  increasing ROM, reducing/eliminating soft tissue swelling/inflammation/restriction, improving soft tissue extensibility and allowing for proper ROM for normal function with self care, reaching, carrying, lifting, house/yardwork, driving/computer work    Charges:  Timed Code Treatment Minutes: 39'   Total Treatment Minutes: 45'        [] EVAL - LOW (05819)   [] EVAL - MOD (81193)  [] EVAL - HIGH (72671)  [] RE-EVAL (26104)  [x] IU(24045) x  2    [] Ionto  [x] NMR (83189) x       [] Vaso  [x] Manual (53437) x   1    [] Ultrasound  [] TA x        [] Mech Traction (43191)  [] Aquatic Therapy x     [] ES (un) (00454):   [] Home Management Training x  [] ES(attended) (04028)   [] Dry Needling 1-2 muscles (23341):  [] Dry Needling 3+ muscles (055070  [] Group:      [] Other:     GOALS: (cut and paste from eval)  Patient stated goal: Abolish pain and cramping sensation in neck/UE  [] Progressing: [] Met: [] Not Met: [] Adjusted     Therapist goals for Patient:  Short Term Goals: To be achieved in: 2 weeks  1. Independent in HEP and progression per patient tolerance, in order to prevent re-injury. [] Progressing: [x] Met: [] Not Met: [] Adjusted  2. Patient will have a decrease in pain to facilitate improvement in movement, function, and ADLs as indicated by Functional Deficits. [] Progressing: [x] Met: [] Not Met: [] Adjusted     Long Term Goals: To be achieved in: 12 weeks  1. FOTO score of at least TBD to assist with reaching prior level of function. [] Progressing: [] Met: [] Not Met: [] Adjusted  2.  Patient will demonstrate increased AROM to [facility] of cervical/thoracic spine to allow for proper joint functioning as indicated by patients Functional Deficits. [x] Progressing: [] Met: [] Not Met: [] Adjusted  3. Patient will demonstrate an increase in postural awareness and control and activation of  Deep cervical stabilizers to allow for proper functional mobility as indicated by patients Functional Deficits. [] Progressing: [] Met: [] Not Met: [] Adjusted  4. Patient will return to functional activities including looking up with head without increased symptoms or restriction. [] Progressing: [] Met: [] Not Met: [] Adjusted  5. Patient to be able to reach overhead and behind back without radicular pain (patient specific functional goal)    [] Progressing: [] Met: [] Not Met: [] Adjusted          Overall Progression Towards Functional goals/ Treatment Progress Update:  [] Patient is progressing as expected towards functional goals listed. [] Progression is slowed due to complexities/Impairments listed. [] Progression has been slowed due to co-morbidities. [x] Plan just implemented, too soon to assess goals progression <30days   [] Goals require adjustment due to lack of progress  [] Patient is not progressing as expected and requires additional follow up with physician  [] Other    Persisting Functional Limitations/Impairments:  [x]Sitting []Standing   []Walking []Squatting/bending    []Stairs []ADL's    []Transfers [x]Reaching  [x]Housework [x]Lifting  [x]Driving []Job related tasks  []Sports/Recreation  [x]Sleeping  []Other:    ASSESSMENT:  pt continues to make objective and functional improvements and pain level is more localized and less often. He continues to demonstrate B scapular weakness and posture deviations that will benefit from continued skilled PT to address.          Treatment/Activity Tolerance:  [x] Patient able to complete tx  [] Patient limited by fatique  [] Patient limited by pain   [] Patient limited by other medical complications  [] Other:     Prognosis: [x] Good [] Fair  [] Poor    Patient Requires Follow-up: [x] Yes  [] No    PLAN: See eval. PT 2x / week for 12 weeks. [x] Continue per plan of care [] Alter current plan (see comments)  [] Plan of care initiated [] Hold pending MD visit [] Discharge    Electronically signed by: Preston Brambila, PT  OMT-C    Note: If patient does not return for scheduled/ recommended follow up visits, this note will serve as a discharge from care along with most recent update on progress.

## 2022-08-30 ENCOUNTER — APPOINTMENT (OUTPATIENT)
Dept: PHYSICAL THERAPY | Age: 47
End: 2022-08-30
Payer: COMMERCIAL

## 2022-08-31 ENCOUNTER — HOSPITAL ENCOUNTER (OUTPATIENT)
Dept: PHYSICAL THERAPY | Age: 47
Setting detail: THERAPIES SERIES
Discharge: HOME | End: 2022-08-31
Payer: COMMERCIAL

## 2022-08-31 PROCEDURE — 97110 THERAPEUTIC EXERCISES: CPT | Performed by: PHYSICAL THERAPIST

## 2022-08-31 PROCEDURE — 97140 MANUAL THERAPY 1/> REGIONS: CPT | Performed by: PHYSICAL THERAPIST

## 2022-08-31 NOTE — FLOWSHEET NOTE
[address] [place] [address], [address] [place] [address],[address]  Phone: [phone]   Fax: [phone]    Physical Therapy Treatment Note/ Progress Report:     Date:  2022    Patient Name:  Richelle Mcknight    :  1975  MRN: [id number]  Restrictions/Precautions:    Medical/Treatment Diagnosis Information:  Diagnosis: M43,02 cervical spondylosis  Treatment Diagnosis: M43,02 cervical spondylosis, M54.2 Cervicalgia  Insurance/Certification information:  PT Insurance Information: [employer] Ambar Moore /60 visits/ No auth  Physician Information:  Axel Ellsworth MD    Plan of care signed (Y/N): []  Yes [x]  No     Date of Patient follow up with Physician: ?     Progress Report: []  Yes  [x]  No     Date Range for reporting period:  Beginnin22  Endin22    Progress report due (10 Rx/or 30 days whichever is less): visit #17 or  (date)     Recertification due (POC duration/ or 90 days whichever is less): visit # or 12 weeks 10/19/22 (date)     Visit # Insurance Allowable Auth required? Date Range   8 60 []  Yes  [x]  No 22-22       Units approved Units used Date Range          Latex Allergy:  [x]NO      []YES  Preferred Language for Healthcare:   [x]English       []other:    Functional Scale:           Date assessed:    FOTO physical FS primary measure score 60/100; risk adjusted 60/100 8/10/22    Pain level:  0/10 pre-session    SUBJECTIVE:  Pt reports he gets pain relief for a couple of days following PT and then it starts to return, but still just in his neck and UT region. OBJECTIVE:    Observation:   Test measurements:      RESTRICTIONS/PRECAUTIONS: HTN, lumbar disc disorder, NEEDS INTERPRETATION SERVICES for French.     Exercises/Interventions:   Therapeutic Exercise (61654) Sets/Reps Notes   Supine chin tucks on 1 pillow   Seated chin tucks  HEP, less form cues needed  Form cues   No money   HAB HEP  HEP   UT stretches to L side HEP   TB row  TB shoulder ext  TB PNF D2 flexion Blue x20 R/L HEP, gave Blue TB 8/22/22  VTC's   Wall single arm pec S 3x30\" R HEP   SB semi prone @ EOB w/ shoulder ext Form/head cues   Seated I, T 3# 2x10 [address] [place]   Table push up SA control W/ SB 2x10 Continued scap winging   TB SA wall slide HEP           MB reaches chest height to top of door frame 15# x20 UT cues B w/ fatigue   CC row  Bicep curl 8 pl x20  8 pl x20     hold 7.5# 3x30\"    Self thoracic ext over chair w/ head/neck supported Resume NV   Seated chin tuck w/ TB Heavy VTC's, difficulty w/ proper form, D/C   Prone HAB, B shoulder ext 0# 2x10 ea Decr Scap cues        Therapeutic Activities (11413)                                   NMR re-education (20292)     Patient education with, HEP, POC, posture,x3'                        Manual Intervention (85068)     Cerv mobs L lat glides x1'    Thoracic mobs/manip     CT manip     Rib mobilizations     STM R>L UT/scalenes/PSM, , TPR R UT x10'    Manual traction in supine 1 pillow X4' intermittent pulls Responded well. Prone upper thoracic PA's, mid thoracic rotation mobs, C5-7 PA's all gr lll x5' Use pillow for lumbar support NV prn            Modalities: declined    Patient education:  -pt educated on diagnosis, prognosis and expectations for rehab  -all pt questions were answered    Home Exercise Program:  HEP instruction: Written HEP instructions provided and reviewed. Access Code: [email or web address]  URL: [email or web address]  Date: 08/24/2022  Prepared by: Judith Alicea    Exercises  Supine Chin Tuck - 2 x daily - 7 x weekly - 2 sets - 10 reps - 5 hold  Shoulder External Rotation and Scapular Retraction with Resistance - 2 x daily - 7 x weekly - 2 sets - 10 reps - 5 hold  Seated Upper Trapezius Stretch - 2 x daily - 7 x weekly - 1-2 sets - 5 reps - 30 hold  Single Arm Doorway Pec Stretch at 90 Degrees Abduction - 1 x daily - 7 x weekly - 3 sets - 30 seconds hold  Standing Shoulder Row with Anchored Resistance - 1 x daily - 7 x weekly - 3 sets - 10 reps  Seated Shoulder Horizontal Abduction with Resistance - 1 x daily - 7 x weekly - 2 sets - 15 reps  Shoulder Flexion Wall Slide with Resistance Band - 1 x daily - 7 x weekly - 2 sets - 15 reps    Therapeutic Exercise and NMR EXR  [x] (17576) Provided verbal/tactile cueing for activities related to strengthening, flexibility, endurance, ROM  for improvements in cervical, postural, scapular, scapulothoracic and UE control with self care, reaching, carrying, lifting, house/yardwork, driving/computer work. [x] (50685) Provided verbal/tactile cueing for activities related to improving balance, coordination, kinesthetic sense, posture, motor skill, proprioception  to assist with cervical, scapular, scapulothoracic and UE control with self care, reaching, carrying, lifting, house/yardwork, driving/computer work.  [] (34360) Therapist is in constant attendance of 2 or more patients providing skilled therapy interventions, but not providing any significant amount of measurable one-on-one time to either patient, for improvements in cervical, scapular, scapulothoracic and UE control with self care, reaching, carrying, lifting, house/yardwork, driving, computer work. Therapeutic Activities:    [] (77543 or 30642) Provided verbal/tactile cueing for activities related to improving balance, coordination, kinesthetic sense, posture, motor skill, proprioception and motor activation to allow for proper function of cervical, scapular, scapulothoracic and UE control with self care, carrying, lifting, driving/computer work.      Home Exercise Program:    [x] (31560) Reviewed/Progressed HEP activities related to strengthening, flexibility, endurance, ROM of cervical, scapular, scapulothoracic and UE control with self care, reaching, carrying, lifting, house/yardwork, driving/computer work  [] (76886) Reviewed/Progressed HEP activities related to improving balance, coordination, kinesthetic sense, posture, motor skill, proprioception of cervical, scapular, scapulothoracic and UE control with self care, reaching, carrying, lifting, house/yardwork, driving/computer work      Manual Treatments:  PROM / STM / Oscillations-Mobs:  G-I, II, III, IV (PA's, Inf., Post.)  [x] (58230) Provided manual therapy to mobilize soft tissue/joints of cervical/CT, scapular GHJ and UE for the purpose of decreasing headache, modulating pain, promoting relaxation,  increasing ROM, reducing/eliminating soft tissue swelling/inflammation/restriction, improving soft tissue extensibility and allowing for proper ROM for normal function with self care, reaching, carrying, lifting, house/yardwork, driving/computer work    Charges:  Timed Code Treatment Minutes: 39'   Total Treatment Minutes: 45'        [] EVAL - LOW (35307)   [] EVAL - MOD (20092)  [] EVAL - HIGH (46102)  [] RE-EVAL (22696)  [x] HZ(95035) x  2    [] Ionto  [x] NMR (82284) x       [] Vaso  [x] Manual (96435) x   1    [] Ultrasound  [] TA x        [] Mech Traction (48432)  [] Aquatic Therapy x     [] ES (un) (06704):   [] Home Management Training x  [] ES(attended) (80373)   [] Dry Needling 1-2 muscles (12909):  [] Dry Needling 3+ muscles (656739  [] Group:      [] Other:     GOALS:   Patient stated goal: Abolish pain and cramping sensation in neck/UE  [x] Progressing: [] Met: [] Not Met: [] Adjusted     Therapist goals for Patient:  Short Term Goals: To be achieved in: 2 weeks  1. Independent in HEP and progression per patient tolerance, in order to prevent re-injury. [] Progressing: [x] Met: [] Not Met: [] Adjusted  2. Patient will have a decrease in pain to facilitate improvement in movement, function, and ADLs as indicated by Functional Deficits. [] Progressing: [x] Met: [] Not Met: [] Adjusted     Long Term Goals: To be achieved in: 12 weeks  1. FOTO score of at least 74 to assist with reaching prior level of function. [x] Progressing: [] Met: [] Not Met: [] Adjusted  2. Patient will demonstrate increased AROM to [facility] of cervical/thoracic spine to allow for proper joint functioning as indicated by patients Functional Deficits. [x] Progressing: [] Met: [] Not Met: [] Adjusted  3. Patient will demonstrate an increase in postural awareness and control and activation of  Deep cervical stabilizers to allow for proper functional mobility as indicated by patients Functional Deficits. [x] Progressing: [] Met: [] Not Met: [] Adjusted  4. Patient will return to functional activities including looking up with head without increased symptoms or restriction. [x] Progressing: [] Met: [] Not Met: [] Adjusted  5. Patient to be able to reach overhead and behind back without radicular pain (patient specific functional goal)    [x] Progressing: [] Met: [] Not Met: [] Adjusted          Overall Progression Towards Functional goals/ Treatment Progress Update:  [x] Patient is progressing as expected towards functional goals listed. [] Progression is slowed due to complexities/Impairments listed. [] Progression has been slowed due to co-morbidities. [] Plan just implemented, too soon to assess goals progression <30days   [] Goals require adjustment due to lack of progress  [] Patient is not progressing as expected and requires additional follow up with physician  [] Other    Persisting Functional Limitations/Impairments:  [x]Sitting []Standing   []Walking []Squatting/bending    []Stairs []ADL's    []Transfers [x]Reaching  [x]Housework [x]Lifting  [x]Driving []Job related tasks  []Sports/Recreation  [x]Sleeping  []Other:    ASSESSMENT: initiated overhead lifting activities without increased pain and B shoulder fatigue noted. Pt requires posture cues with new exercises and continued scapular winging noted with table push ups still.          Treatment/Activity Tolerance:  [x] Patient able to complete tx  [] Patient limited by fatique  [] Patient limited by pain   [] Patient limited by other medical complications  [] Other:     Prognosis: [x] Good [] Fair  [] Poor    Patient Requires Follow-up: [x] Yes  [] No    PLAN: See eval. PT 2x / week for 12 weeks. [x] Continue per plan of care [] Alter current plan (see comments)  [] Plan of care initiated [] Hold pending MD visit [] Discharge    Electronically signed by: Sarkis Monterroso, PT  OMT-C    Note: If patient does not return for scheduled/ recommended follow up visits, this note will serve as a discharge from care along with most recent update on progress.

## 2022-09-05 ENCOUNTER — APPOINTMENT (OUTPATIENT)
Dept: PHYSICAL THERAPY | Age: 47
End: 2022-09-05
Payer: COMMERCIAL

## 2022-09-06 ENCOUNTER — HOSPITAL ENCOUNTER (OUTPATIENT)
Dept: PHYSICAL THERAPY | Age: 47
Setting detail: THERAPIES SERIES
Discharge: HOME | End: 2022-09-06
Payer: COMMERCIAL

## 2022-09-06 PROCEDURE — 97110 THERAPEUTIC EXERCISES: CPT | Performed by: PHYSICAL THERAPIST

## 2022-09-06 PROCEDURE — 97140 MANUAL THERAPY 1/> REGIONS: CPT | Performed by: PHYSICAL THERAPIST

## 2022-09-06 NOTE — FLOWSHEET NOTE
[address] [facility]  Phone: [phone]   Fax: [phone]    Physical Therapy Treatment Note/ Progress Report:     Date:  2022    Patient Name:  Ya Goodman    :  1975  MRN: [id number]  Restrictions/Precautions:    Medical/Treatment Diagnosis Information:  Diagnosis: M43,02 cervical spondylosis  Treatment Diagnosis: M43,02 cervical spondylosis, M54.2 Cervicalgia  Insurance/Certification information:  PT Insurance Information: Sherman Bill Sheridan Neal /60 visits/ No auth  Physician Information:  [facility] MD    Plan of care signed (Y/N): []  Yes [x]  No     Date of Patient follow up with Physician: ?     Progress Report: []  Yes  [x]  No     Date Range for reporting period:  Beginnin22  Endin22    Progress report due (10 Rx/or 30 days whichever is less): visit #17 or  (date)     Recertification due (POC duration/ or 90 days whichever is less): visit # or 12 weeks 10/19/22 (date)     Visit # Insurance Allowable Auth required? Date Range   9 60 []  Yes  [x]  No 22-22       Units approved Units used Date Range          Latex Allergy:  [x]NO      []YES  Preferred Language for Healthcare:   [x]English       []other:    Functional Scale:           Date assessed:  FOTO physical FS primary measure score 60/100; risk adjusted 60/100 8/10/22  FOTO 61          22    Pain level:  0/10 pre-session    SUBJECTIVE:  Pt denies soreness following last session. OBJECTIVE:    Observation:   Test measurements:      RESTRICTIONS/PRECAUTIONS: HTN, lumbar disc disorder, NEEDS INTERPRETATION SERVICES for Chinese.     Exercises/Interventions:   Therapeutic Exercise (80489) Sets/Reps Notes   Supine chin tucks on 1 pillow   Seated chin tucks  HEP, less form cues needed  Form cues   No money   HAB HEP  HEP   UT stretches  w/ strap 30\"x3 reps ea R/L HEP   TB row  TB shoulder ext  TB PNF D2 flexion Blue 2x15 R/L HEP, gave Blue TB 8/22/22  VTC's   Wall single arm pec S HEP   SB semi prone @ EOB w/ shoulder ext Form/head cues   Seated I, T 4# 2x10 ea    Table push up SA control Continued scap winging   TB SA wall slide HEP           MB reaches chest height to top of door frame UT cues B w/ fatigue   CC row  Bicep curl  Shoulder ext 8 pl x20  8 pl x20  7 pl x20     hold 10# 3x30\" R/L    Self thoracic ext over chair w/ head/neck supported    Seated chin tuck w/ TB Heavy VTC's, difficulty w/ proper form, D/C   Prone over SB HAB, B shoulder ext 2# 2x15 ea Scap cues initially        Therapeutic Activities (17941)                                   NMR re-education (97999)     Patient education with, HEP, POC, posture,                       Manual Intervention (31158)     Cerv mobs L lat glides x1'    Thoracic mobs/manip     CT manip     Rib mobilizations     STM R>L UT/scalenes/PSM, , TPR R UT x6'    Manual traction in supine 1 pillow X4' intermittent pulls Responded well. Prone upper thoracic PA's, mid thoracic rotation mobs, C5-7 PA's all gr lll x5' Use pillow for lumbar support NV prn            Modalities: declined    Patient education:  -pt educated on diagnosis, prognosis and expectations for rehab  -all pt questions were answered    Home Exercise Program:  HEP instruction: Written HEP instructions provided and reviewed. Access Code: [email or web address]  URL: [email or web address]  Date: 08/24/2022  Prepared by: Chago Woo    Exercises  Supine Chin Tuck - 2 x daily - 7 x weekly - 2 sets - 10 reps - 5 hold  Shoulder External Rotation and Scapular Retraction with Resistance - 2 x daily - 7 x weekly - 2 sets - 10 reps - 5 hold  Seated Upper Trapezius Stretch - 2 x daily - 7 x weekly - 1-2 sets - 5 reps - 30 hold  Single Arm Doorway Pec Stretch at 90 Degrees Abduction - 1 x daily - 7 x weekly - 3 sets - 30 seconds hold  Standing Shoulder Row with Anchored Resistance - 1 x daily - 7 x weekly - 3 sets - 10 reps  Seated Shoulder Horizontal Abduction with Resistance - 1 x daily - 7 x weekly - 2 sets - 15 reps  Shoulder Flexion Wall Slide with Resistance Band - 1 x daily - 7 x weekly - 2 sets - 15 reps    Therapeutic Exercise and NMR EXR  [x] (41610) Provided verbal/tactile cueing for activities related to strengthening, flexibility, endurance, ROM  for improvements in cervical, postural, scapular, scapulothoracic and UE control with self care, reaching, carrying, lifting, house/yardwork, driving/computer work. [x] (56108) Provided verbal/tactile cueing for activities related to improving balance, coordination, kinesthetic sense, posture, motor skill, proprioception  to assist with cervical, scapular, scapulothoracic and UE control with self care, reaching, carrying, lifting, house/yardwork, driving/computer work.  [] (46267) Therapist is in constant attendance of 2 or more patients providing skilled therapy interventions, but not providing any significant amount of measurable one-on-one time to either patient, for improvements in cervical, scapular, scapulothoracic and UE control with self care, reaching, carrying, lifting, house/yardwork, driving, computer work. Therapeutic Activities:    [] (16667 or 90569) Provided verbal/tactile cueing for activities related to improving balance, coordination, kinesthetic sense, posture, motor skill, proprioception and motor activation to allow for proper function of cervical, scapular, scapulothoracic and UE control with self care, carrying, lifting, driving/computer work.      Home Exercise Program:    [x] (70845) Reviewed/Progressed HEP activities related to strengthening, flexibility, endurance, ROM of cervical, scapular, scapulothoracic and UE control with self care, reaching, carrying, lifting, house/yardwork, driving/computer work  [] (08640) Reviewed/Progressed HEP activities related to improving balance, coordination, kinesthetic sense, posture, motor skill, proprioception of cervical, scapular, scapulothoracic and UE control with self care, reaching, carrying, lifting, house/yardwork, driving/computer work      Manual Treatments:  PROM / STM / Oscillations-Mobs:  G-I, II, III, IV (PA's, Inf., Post.)  [x] (76161) Provided manual therapy to mobilize soft tissue/joints of cervical/CT, scapular GHJ and UE for the purpose of decreasing headache, modulating pain, promoting relaxation,  increasing ROM, reducing/eliminating soft tissue swelling/inflammation/restriction, improving soft tissue extensibility and allowing for proper ROM for normal function with self care, reaching, carrying, lifting, house/yardwork, driving/computer work    Charges:  Timed Code Treatment Minutes: 45'   Total Treatment Minutes: 45'        [] EVAL - LOW (28435)   [] EVAL - MOD (21394)  [] EVAL - HIGH (90950)  [] RE-EVAL (75863)  [x] UO(55291) x  2    [] Ionto  [x] NMR (85682) x       [] Vaso  [x] Manual (84075) x   1    [] Ultrasound  [] TA x        [] Mech Traction (71984)  [] Aquatic Therapy x     [] ES (un) (16817):   [] Home Management Training x  [] ES(attended) (84297)   [] Dry Needling 1-2 muscles (18794):  [] Dry Needling 3+ muscles (144199  [] Group:      [] Other:     GOALS:   Patient stated goal: Abolish pain and cramping sensation in neck/UE  [x] Progressing: [] Met: [] Not Met: [] Adjusted     Therapist goals for Patient:  Short Term Goals: To be achieved in: 2 weeks  1. Independent in HEP and progression per patient tolerance, in order to prevent re-injury. [] Progressing: [x] Met: [] Not Met: [] Adjusted  2. Patient will have a decrease in pain to facilitate improvement in movement, function, and ADLs as indicated by Functional Deficits. [] Progressing: [x] Met: [] Not Met: [] Adjusted     Long Term Goals: To be achieved in: 12 weeks  1. FOTO score of at least 74 to assist with reaching prior level of function. [x] Progressing: [] Met: [] Not Met: [] Adjusted  2.  Patient will demonstrate increased AROM to [facility] of cervical/thoracic spine to allow for proper joint functioning as indicated by patients Functional Deficits. [x] Progressing: [] Met: [] Not Met: [] Adjusted  3. Patient will demonstrate an increase in postural awareness and control and activation of  Deep cervical stabilizers to allow for proper functional mobility as indicated by patients Functional Deficits. [x] Progressing: [] Met: [] Not Met: [] Adjusted  4. Patient will return to functional activities including looking up with head without increased symptoms or restriction. [x] Progressing: [] Met: [] Not Met: [] Adjusted  5. Patient to be able to reach overhead and behind back without radicular pain (patient specific functional goal)    [x] Progressing: [] Met: [] Not Met: [] Adjusted          Overall Progression Towards Functional goals/ Treatment Progress Update:  [x] Patient is progressing as expected towards functional goals listed. [] Progression is slowed due to complexities/Impairments listed. [] Progression has been slowed due to co-morbidities. [] Plan just implemented, too soon to assess goals progression <30days   [] Goals require adjustment due to lack of progress  [] Patient is not progressing as expected and requires additional follow up with physician  [] Other    Persisting Functional Limitations/Impairments:  [x]Sitting []Standing   []Walking []Squatting/bending    []Stairs []ADL's    []Transfers [x]Reaching  [x]Housework [x]Lifting  []Driving []Job related tasks  []Sports/Recreation  []Sleeping  []Other:    ASSESSMENT: able to perform UT S B without reported neck pain. Progression of strengthening with general fatigue noted and no increased pain.          Treatment/Activity Tolerance:  [x] Patient able to complete tx  [] Patient limited by fatique  [] Patient limited by pain   [] Patient limited by other medical complications  [] Other:     Prognosis: [x] Good [] Fair  [] Poor    Patient Requires Follow-up: [x] Yes  [] No    PLAN: See eval. PT 2x / week for 12 weeks. [x] Continue per plan of care [] Alter current plan (see comments)  [] Plan of care initiated [] Hold pending MD visit [] Discharge    Electronically signed by: Hi Middleton, PT  SUZANT-C    Note: If patient does not return for scheduled/ recommended follow up visits, this note will serve as a discharge from care along with most recent update on progress.

## 2022-09-08 ENCOUNTER — HOSPITAL ENCOUNTER (OUTPATIENT)
Dept: PHYSICAL THERAPY | Age: 47
Setting detail: THERAPIES SERIES
Discharge: HOME | End: 2022-09-08
Payer: COMMERCIAL

## 2022-09-08 PROCEDURE — 97110 THERAPEUTIC EXERCISES: CPT | Performed by: PHYSICAL THERAPIST

## 2022-09-08 PROCEDURE — 97140 MANUAL THERAPY 1/> REGIONS: CPT | Performed by: PHYSICAL THERAPIST

## 2022-09-08 NOTE — FLOWSHEET NOTE
[facility]  Phone: [phone]   Fax: [phone]    Physical Therapy Treatment Note/ Progress Report:     Date:  2022    Patient Name:  Escobar Mojica    :  1975  MRN: [id number]  Restrictions/Precautions:    Medical/Treatment Diagnosis Information:  Diagnosis: M43,02 cervical spondylosis  Treatment Diagnosis: M43,02 cervical spondylosis, M54.2 Cervicalgia  Insurance/Certification information:  PT Insurance Information: Lily Philippe /60 visits/ No auth  Physician Information:  Jeanne Desai MD    Plan of care signed (Y/N): []  Yes [x]  No     Date of Patient follow up with Physician: ?     Progress Report: []  Yes  [x]  No     Date Range for reporting period:  Beginnin22  Endin22    Progress report due (10 Rx/or 30 days whichever is less): visit #17 or  (date)     Recertification due (POC duration/ or 90 days whichever is less): visit # or 12 weeks 10/19/22 (date)     Visit # Insurance Allowable Auth required? Date Range   10 60 []  Yes  [x]  No 22-22       Units approved Units used Date Range          Latex Allergy:  [x]NO      []YES  Preferred Language for Healthcare:   [x]English       []other:    Functional Scale:           Date assessed:  FOTO physical FS primary measure score 60/100; risk adjusted 60/100 8/10/22  FOTO 61          22    Pain level:  3/10 pre-session, 1/10 post session    SUBJECTIVE:  Pt reports soreness in his R UT region following last session with increased weights. OBJECTIVE:    Observation:   Test measurements:      RESTRICTIONS/PRECAUTIONS: HTN, lumbar disc disorder, NEEDS INTERPRETATION SERVICES for [place].     Exercises/Interventions:   Therapeutic Exercise (01296) Sets/Reps Notes   Supine chin tucks on 1 pillow   Seated chin tucks  HEP, less form cues needed  Form cues   No money   HAB HEP  HEP   UT stretches  w/ strap 30\"x3 reps ea RHEP   TB row  TB shoulder ext  TB PNF D2 flexion HEP, gave Blue TB 8/22/22  VTC's   Wall single arm pec S HEP   SB semi prone @ EOB w/ shoulder ext Form/head cues   Seated I, T 4# 2x10 ea    Table push up SA control Continued scap winging   TB SA wall slide HEP           MB reaches chest height to top of door frame UT cues B w/ fatigue   CC row  Bicep curl  Shoulder ext 7 pl x20  7 pl x20  6 pl x20     hold 10# 3x30\" R/L    Self thoracic ext over chair w/ head/neck supported    Seated chin tuck w/ TB Heavy VTC's, difficulty w/ proper form, D/C   Prone over SB HAB, B shoulder ext 2# 2x15 ea Scap cues initially        Therapeutic Activities (35769)                                   NMR re-education (21331)     Patient education with, HEP, POC, posture,                       Manual Intervention (13201)     Cerv mobs L lat glides x1'    Thoracic mobs/manip     CT manip     Rib mobilizations     STM R>L UT/scalenes/PSM, , TPR R UT x14'    Manual traction in supine 1 pillow X4' intermittent pulls Responded well. Prone upper thoracic PA's, mid thoracic rotation mobs, C5-7 PA's all gr lll x5' Use pillow for lumbar support NV prn            Modalities: declined    Patient education:  -pt educated on diagnosis, prognosis and expectations for rehab  -all pt questions were answered    Home Exercise Program:  HEP instruction: Written HEP instructions provided and reviewed. Access Code: [email or web address]  URL: [email or web address]  Date: 08/24/2022  Prepared by: Faviola Randel    Exercises  Supine Chin Tuck - 2 x daily - 7 x weekly - 2 sets - 10 reps - 5 hold  Shoulder External Rotation and Scapular Retraction with Resistance - 2 x daily - 7 x weekly - 2 sets - 10 reps - 5 hold  Seated Upper Trapezius Stretch - 2 x daily - 7 x weekly - 1-2 sets - 5 reps - 30 hold  Single Arm Doorway Pec Stretch at 90 Degrees Abduction - 1 x daily - 7 x weekly - 3 sets - 30 seconds hold  Standing Shoulder Row with Anchored Resistance - 1 x daily - 7 x weekly - 3 sets - 10 reps  Seated Shoulder Horizontal Abduction with Resistance - 1 x daily - 7 x weekly - 2 sets - 15 reps  Shoulder Flexion Wall Slide with Resistance Band - 1 x daily - 7 x weekly - 2 sets - 15 reps    Therapeutic Exercise and NMR EXR  [x] (63498) Provided verbal/tactile cueing for activities related to strengthening, flexibility, endurance, ROM  for improvements in cervical, postural, scapular, scapulothoracic and UE control with self care, reaching, carrying, lifting, house/yardwork, driving/computer work. [x] (75062) Provided verbal/tactile cueing for activities related to improving balance, coordination, kinesthetic sense, posture, motor skill, proprioception  to assist with cervical, scapular, scapulothoracic and UE control with self care, reaching, carrying, lifting, house/yardwork, driving/computer work.  [] (44679) Therapist is in constant attendance of 2 or more patients providing skilled therapy interventions, but not providing any significant amount of measurable one-on-one time to either patient, for improvements in cervical, scapular, scapulothoracic and UE control with self care, reaching, carrying, lifting, house/yardwork, driving, computer work. Therapeutic Activities:    [] (54114 or 45109) Provided verbal/tactile cueing for activities related to improving balance, coordination, kinesthetic sense, posture, motor skill, proprioception and motor activation to allow for proper function of cervical, scapular, scapulothoracic and UE control with self care, carrying, lifting, driving/computer work.      Home Exercise Program:    [x] (61160) Reviewed/Progressed HEP activities related to strengthening, flexibility, endurance, ROM of cervical, scapular, scapulothoracic and UE control with self care, reaching, carrying, lifting, house/yardwork, driving/computer work  [] (04076) Reviewed/Progressed HEP activities related to improving balance, coordination, kinesthetic sense, posture, motor will demonstrate increased AROM to [facility] of cervical/thoracic spine to allow for proper joint functioning as indicated by patients Functional Deficits. [x] Progressing: [] Met: [] Not Met: [] Adjusted  3. Patient will demonstrate an increase in postural awareness and control and activation of  Deep cervical stabilizers to allow for proper functional mobility as indicated by patients Functional Deficits. [x] Progressing: [] Met: [] Not Met: [] Adjusted  4. Patient will return to functional activities including looking up with head without increased symptoms or restriction. [x] Progressing: [] Met: [] Not Met: [] Adjusted  5. Patient to be able to reach overhead and behind back without radicular pain (patient specific functional goal)    [x] Progressing: [] Met: [] Not Met: [] Adjusted          Overall Progression Towards Functional goals/ Treatment Progress Update:  [x] Patient is progressing as expected towards functional goals listed. [] Progression is slowed due to complexities/Impairments listed. [] Progression has been slowed due to co-morbidities. [] Plan just implemented, too soon to assess goals progression <30days   [] Goals require adjustment due to lack of progress  [] Patient is not progressing as expected and requires additional follow up with physician  [] Other    Persisting Functional Limitations/Impairments:  [x]Sitting []Standing   []Walking []Squatting/bending    []Stairs []ADL's    []Transfers [x]Reaching  [x]Housework [x]Lifting  []Driving []Job related tasks  []Sports/Recreation  []Sleeping  []Other:    ASSESSMENT: increased time with manuals this visit due to R UT soreness and tightness. Decreased wts for CC exercises and continued cues needed for proper scap control.          Treatment/Activity Tolerance:  [x] Patient able to complete tx  [] Patient limited by fatique  [] Patient limited by pain   [] Patient limited by other medical complications  [] Other:     Prognosis: [x] Good [] Fair  [] Poor    Patient Requires Follow-up: [x] Yes  [] No    PLAN: See eval. PT 2x / week for 12 weeks. [x] Continue per plan of care [] Alter current plan (see comments)  [] Plan of care initiated [] Hold pending MD visit [] Discharge    Electronically signed by: Judith Alicea PT  OMT-C    Note: If patient does not return for scheduled/ recommended follow up visits, this note will serve as a discharge from care along with most recent update on progress.

## 2022-09-12 ENCOUNTER — HOSPITAL ENCOUNTER (OUTPATIENT)
Dept: PHYSICAL THERAPY | Age: 47
Setting detail: THERAPIES SERIES
Discharge: HOME | End: 2022-09-12
Payer: COMMERCIAL

## 2022-09-12 PROCEDURE — 97110 THERAPEUTIC EXERCISES: CPT | Performed by: PHYSICAL THERAPIST

## 2022-09-12 PROCEDURE — 97140 MANUAL THERAPY 1/> REGIONS: CPT | Performed by: PHYSICAL THERAPIST

## 2022-09-12 NOTE — FLOWSHEET NOTE
[address] [place] [address]  Phone: [phone]   Fax: [phone]    Physical Therapy Treatment Note/ Progress Report:     Date:  2022    Patient Name:  Faustina Suarez    :  1975  MRN: [id number]  Restrictions/Precautions:    Medical/Treatment Diagnosis Information:  Diagnosis: M43,02 cervical spondylosis  Treatment Diagnosis: M43,02 cervical spondylosis, M54.2 Cervicalgia  Insurance/Certification information:  PT Insurance Information: [facility] /60 visits/ No auth  Physician Information:  Lidia Mitchell MD    Plan of care signed (Y/N): []  Yes [x]  No     Date of Patient follow up with Physician: ?     Progress Report: []  Yes  [x]  No     Date Range for reporting period:  Beginnin22  Endin22    Progress report due (10 Rx/or 30 days whichever is less): visit #17 or  (date)     Recertification due (POC duration/ or 90 days whichever is less): visit # or 12 weeks 10/19/22 (date)     Visit # Insurance Allowable Auth required? Date Range   11 60 []  Yes  [x]  No 22-22       Units approved Units used Date Range          Latex Allergy:  [x]NO      []YES  Preferred Language for Healthcare:   [x]English       []other:    Functional Scale:           Date assessed:  FOTO physical FS primary measure score 60/100; risk adjusted 60/100 8/10/22  FOTO 61          22    Pain level:  0/10 pre-session, 0/10 post session    SUBJECTIVE:  Pt reports his neck is feeling better today. OBJECTIVE:    Observation:   Test measurements:      RESTRICTIONS/PRECAUTIONS: HTN, lumbar disc disorder, NEEDS INTERPRETATION SERVICES for [place].     Exercises/Interventions:   Therapeutic Exercise (53844) Sets/Reps Notes   Supine chin tucks on 1 pillow   Seated chin tucks  HEP, less form cues needed  Form cues   No money   HAB HEP  HEP   UT stretches  w/ strap 30\"x3 reps ea R/L HEP   TB row  TB shoulder ext  TB PNF D2 flexion HEP, gave Blue TB 8/22/22  VTC's   Wall single arm pec S HEP   SB semi prone @ EOB w/ shoulder ext Form/head cues   Seated I, T 4# 2x10 ea    Table push up SA control 2x10 Good control   TB SA wall slide HEP           MB reaches chest height to top of door frame UT cues B w/ fatigue   CC row  Bicep curl  Shoulder ext 7 pl x20  7 pl x20  6 pl x20     hold 10# 2x60\" R/L    Self thoracic ext over chair w/ head/neck supported    Seated chin tuck w/ TB Heavy VTC's, difficulty w/ proper form, D/C   Prone over SB HAB, B shoulder ext 3# 2x15 ea Scap cues initially        Therapeutic Activities (35495)                                   NMR re-education (12047)     Patient education with, HEP, POC, posture,                       Manual Intervention (77751)     Cerv mobs L lat glides x1'    Thoracic mobs/manip     CT manip     Rib mobilizations     STM R>L UT/scalenes/PSM, , TPR R UT x8'    Manual traction in supine 1 pillow X4' intermittent pulls Responded well. Prone upper thoracic PA's, mid thoracic rotation mobs, C5-7 PA's all gr lll x5' [place] ~T4 9/12/22            Modalities: declined    Patient education:  -pt educated on diagnosis, prognosis and expectations for rehab  -all pt questions were answered    Home Exercise Program:  HEP instruction: Written HEP instructions provided and reviewed. Access Code: [email or web address]  URL: [email or web address]  Date: 08/24/2022  Prepared by: Adela Bajwa    Exercises  Supine Chin Tuck - 2 x daily - 7 x weekly - 2 sets - 10 reps - 5 hold  Shoulder External Rotation and Scapular Retraction with Resistance - 2 x daily - 7 x weekly - 2 sets - 10 reps - 5 hold  Seated Upper Trapezius Stretch - 2 x daily - 7 x weekly - 1-2 sets - 5 reps - 30 hold  Single Arm Doorway Pec Stretch at 90 Degrees Abduction - 1 x daily - 7 x weekly - 3 sets - 30 seconds hold  Standing Shoulder Row with Anchored Resistance - 1 x daily - 7 x weekly - 3 sets - 10 reps  Seated Shoulder Horizontal Abduction with Resistance - 1 x daily - 7 x weekly - 2 sets - 15 reps  Shoulder Flexion Wall Slide with Resistance Band - 1 x daily - 7 x weekly - 2 sets - 15 reps    Therapeutic Exercise and NMR EXR  [x] (27785) Provided verbal/tactile cueing for activities related to strengthening, flexibility, endurance, ROM  for improvements in cervical, postural, scapular, scapulothoracic and UE control with self care, reaching, carrying, lifting, house/yardwork, driving/computer work. [x] (12304) Provided verbal/tactile cueing for activities related to improving balance, coordination, kinesthetic sense, posture, motor skill, proprioception  to assist with cervical, scapular, scapulothoracic and UE control with self care, reaching, carrying, lifting, house/yardwork, driving/computer work.  [] (19448) Therapist is in constant attendance of 2 or more patients providing skilled therapy interventions, but not providing any significant amount of measurable one-on-one time to either patient, for improvements in cervical, scapular, scapulothoracic and UE control with self care, reaching, carrying, lifting, house/yardwork, driving, computer work. Therapeutic Activities:    [] (48233 or 41175) Provided verbal/tactile cueing for activities related to improving balance, coordination, kinesthetic sense, posture, motor skill, proprioception and motor activation to allow for proper function of cervical, scapular, scapulothoracic and UE control with self care, carrying, lifting, driving/computer work.      Home Exercise Program:    [x] (54964) Reviewed/Progressed HEP activities related to strengthening, flexibility, endurance, ROM of cervical, scapular, scapulothoracic and UE control with self care, reaching, carrying, lifting, house/yardwork, driving/computer work  [] (93527) Reviewed/Progressed HEP activities related to improving balance, coordination, kinesthetic sense, posture, motor skill, proprioception of cervical, scapular, scapulothoracic and UE control with self care, reaching, carrying, lifting, house/yardwork, driving/computer work      Manual Treatments:  PROM / STM / Oscillations-Mobs:  G-I, II, III, IV (PA's, Inf., Post.)  [x] (57950) Provided manual therapy to mobilize soft tissue/joints of cervical/CT, scapular GHJ and UE for the purpose of decreasing headache, modulating pain, promoting relaxation,  increasing ROM, reducing/eliminating soft tissue swelling/inflammation/restriction, improving soft tissue extensibility and allowing for proper ROM for normal function with self care, reaching, carrying, lifting, house/yardwork, driving/computer work    Charges:  Timed Code Treatment Minutes: 39'   Total Treatment Minutes: 45'        [] EVAL - LOW (71901)   [] EVAL - MOD (93108)  [] EVAL - HIGH (66079)  [] RE-EVAL (54023)  [x] [place](78963) x  2    [] Ionto  [x] NMR (40637) x       [] Vaso  [x] Manual (75502) x   1   [] Ultrasound  [] TA x        [] Mech Traction (06027)  [] Aquatic Therapy x     [] ES (un) (92432):   [] Home Management Training x  [] ES(attended) (38372)   [] Dry Needling 1-2 muscles (04990):  [] Dry Needling 3+ muscles (553315  [] Group:      [] Other:     GOALS:   Patient stated goal: Abolish pain and cramping sensation in neck/UE  [x] Progressing: [] Met: [] Not Met: [] Adjusted     Therapist goals for Patient:  Short Term Goals: To be achieved in: 2 weeks  1. Independent in HEP and progression per patient tolerance, in order to prevent re-injury. [] Progressing: [x] Met: [] Not Met: [] Adjusted  2. Patient will have a decrease in pain to facilitate improvement in movement, function, and ADLs as indicated by Functional Deficits. [] Progressing: [x] Met: [] Not Met: [] Adjusted     Long Term Goals: To be achieved in: 12 weeks  1. FOTO score of at least 74 to assist with reaching prior level of function. [x] Progressing: [] Met: [] Not Met: [] Adjusted  2.  Patient will demonstrate increased AROM to [facility] of cervical/thoracic spine to allow for proper joint functioning as indicated by patients Functional Deficits. [x] Progressing: [] Met: [] Not Met: [] Adjusted  3. Patient will demonstrate an increase in postural awareness and control and activation of  Deep cervical stabilizers to allow for proper functional mobility as indicated by patients Functional Deficits. [x] Progressing: [] Met: [] Not Met: [] Adjusted  4. Patient will return to functional activities including looking up with head without increased symptoms or restriction. [x] Progressing: [] Met: [] Not Met: [] Adjusted  5. Patient to be able to reach overhead and behind back without radicular pain (patient specific functional goal)    [x] Progressing: [] Met: [] Not Met: [] Adjusted          Overall Progression Towards Functional goals/ Treatment Progress Update:  [x] Patient is progressing as expected towards functional goals listed. [] Progression is slowed due to complexities/Impairments listed. [] Progression has been slowed due to co-morbidities. [] Plan just implemented, too soon to assess goals progression <30days   [] Goals require adjustment due to lack of progress  [] Patient is not progressing as expected and requires additional follow up with physician  [] Other    Persisting Functional Limitations/Impairments:  [x]Sitting []Standing   []Walking []Squatting/bending    []Stairs []ADL's    []Transfers [x]Reaching  [x]Housework [x]Lifting  []Driving []Job related tasks  []Sports/Recreation  []Sleeping  []Other:    ASSESSMENT: able to add back full lifting routine without increased muscle tightness or pain and decreasing scap cues needed. No winging noted with table push up. Able to perform strap UT S B w/o increased neck pain.          Treatment/Activity Tolerance:  [x] Patient able to complete tx  [] Patient limited by fatique  [] Patient limited by pain   [] Patient limited by other medical complications  [] Other:     Prognosis: [x] Good [] Fair  [] Poor    Patient Requires Follow-up: [x] Yes  [] No    PLAN: See eval. PT 2x / week for 12 weeks. [x] Continue per plan of care [] Alter current plan (see comments)  [] Plan of care initiated [] Hold pending MD visit [] Discharge    Electronically signed by: Dante Diane PT  OMT-C    Note: If patient does not return for scheduled/ recommended follow up visits, this note will serve as a discharge from care along with most recent update on progress.

## 2022-09-14 ENCOUNTER — HOSPITAL ENCOUNTER (OUTPATIENT)
Dept: PHYSICAL THERAPY | Age: 47
Setting detail: THERAPIES SERIES
Discharge: HOME | End: 2022-09-14
Payer: COMMERCIAL

## 2022-09-14 NOTE — FLOWSHEET NOTE
[facility]      Physical Therapy  Cancellation/No-show Note  Patient Name:  Cal Jeffrey  :  1975   Date:  2022  Cancelled visits to date: 1  No-shows to date: 0    For today's appointment patient:  [x]  Cancelled  []  Rescheduled appointment  []  No-show     Reason given by patient:  [x]  Patient ill  []  Conflicting appointment  []  No transportation    []  Conflict with work  []  No reason given  []  Other:     Comments:      Electronically signed by:  Radha Moreno PT

## 2022-09-19 ENCOUNTER — HOSPITAL ENCOUNTER (OUTPATIENT)
Dept: PHYSICAL THERAPY | Age: 47
Setting detail: THERAPIES SERIES
Discharge: HOME | End: 2022-09-19
Payer: COMMERCIAL

## 2022-09-19 PROCEDURE — 97140 MANUAL THERAPY 1/> REGIONS: CPT | Performed by: PHYSICAL THERAPIST

## 2022-09-19 PROCEDURE — 97110 THERAPEUTIC EXERCISES: CPT | Performed by: PHYSICAL THERAPIST

## 2022-09-19 NOTE — FLOWSHEET NOTE
[address] [place] [address]  Phone: [phone]   Fax: [phone]    Physical Therapy Treatment Note/ Progress Report:     Date:  2022    Patient Name:  Natividad Quezada    :  1975  MRN: [id number]  Restrictions/Precautions:    Medical/Treatment Diagnosis Information:  Diagnosis: M43,02 cervical spondylosis  Treatment Diagnosis: M43,02 cervical spondylosis, M54.2 Cervicalgia  Insurance/Certification information:  PT Insurance Information: [facility] /60 visits/ No auth  Physician Information:  Olga Pagan MD    Plan of care signed (Y/N): []  Yes [x]  No     Date of Patient follow up with Physician: ?     Progress Report: []  Yes  [x]  No     Date Range for reporting period:  Beginnin22  Endin22    Progress report due (10 Rx/or 30 days whichever is less): visit #17 or  (date)     Recertification due (POC duration/ or 90 days whichever is less): visit # or 12 weeks 10/19/22 (date)     Visit # Insurance Allowable Auth required? Date Range   12 60 []  Yes  [x]  No 22-22       Units approved Units used Date Range          Latex Allergy:  [x]NO      []YES  Preferred Language for Healthcare:   [x]English       []other:    Functional Scale:           Date assessed:  FOTO physical FS primary measure score 60/100; risk adjusted 60/100 8/10/22  FOTO 61          22    Pain level:  1-2/10 pre-session, 0/10 post session    SUBJECTIVE:  Pt reports he is a little more sore today. He was sick last week and didn't do much with his HEP. OBJECTIVE:    Observation:   Test measurements:   cervical rotation: R 65 w/ pain/L WNL no pain    RESTRICTIONS/PRECAUTIONS: HTN, lumbar disc disorder, NEEDS INTERPRETATION SERVICES for [place].     Exercises/Interventions:   Therapeutic Exercise (75499) Sets/Reps Notes   Supine chin tucks on 1 pillow   Seated chin tucks  HEP, less form cues needed  Form cues   No money   HAB HEP  HEP   UT stretches  w/ strap 30\"x3 reps ea R/L HEP   TB row  TB shoulder ext  TB PNF D2 flexion HEP, gave Blue TB 8/22/22  VTC's   Wall  B arm pec S 3x30\" B HEP   SB semi prone @ EOB w/ shoulder ext Form/head cues   Seated I, T 5# 2x10 ea    Table push up SA control 2x10 Good control   TB SA wall slide HEP   SNAG R rotation with pillowcase x10 HEP       MB reaches chest height to top of door frame UT cues B w/ fatigue   CC row  Bicep curl  Shoulder ext 7 pl x20  7 pl x20  6 pl x20     hold 10# 3x30\" R/L    Self thoracic ext over chair w/ head/neck supported    Seated chin tuck w/ TB Heavy VTC's, difficulty w/ proper form, D/C   Prone over SB HAB, B shoulder ext 3# 2x15 ea Scap cues initially        Therapeutic Activities (07061)                                   NMR re-education (40508)     Patient education with, HEP, POC, posture,                       Manual Intervention (23253)     Cerv mobs L lat glides x1'    Thoracic mobs/manip     CT manip     Rib mobilizations     STM R>L UT/scalenes/PSM, , TPR R UT x8'    Manual traction in supine 1 pillow X4' intermittent pulls Responded well. Prone upper thoracic PA's, mid thoracic rotation mobs, C5-7 PA's all gr lll x5' [place] ~T4 9/12/22            Modalities: declined    Patient education:  -pt educated on diagnosis, prognosis and expectations for rehab  -all pt questions were answered    Home Exercise Program:  HEP instruction: Written HEP instructions provided and reviewed. Access Code: [email or web address]  URL: [email or web address]  Date: 09/19/2022  Prepared by: Sarkis Monterroso    Exercises  Supine Chin Tuck - 2 x daily - 7 x weekly - 2 sets - 10 reps - 5 hold  Shoulder External Rotation and Scapular Retraction with Resistance - 2 x daily - 7 x weekly - 2 sets - 10 reps - 5 hold  Seated Shoulder Horizontal Abduction with Resistance - 1 x daily - 7 x weekly - 2 sets - 15 reps  Seated Upper Trapezius Stretch - 2 x daily - 7 x weekly - 1-2 sets - 5 reps - 30 hold  Single Arm Doorway Pec Stretch at 90 Degrees Abduction - 1 x daily - 7 x weekly - 3 sets - 30 seconds hold  Standing Shoulder Row with Anchored Resistance - 1 x daily - 7 x weekly - 3 sets - 10 reps  Shoulder Flexion Wall Slide with Resistance Band - 1 x daily - 7 x weekly - 2 sets - 15 reps  Seated Assisted Cervical Rotation with Towel (Mirrored) - 1 x daily - 7 x weekly - 1-2 sets - 10 reps  First Rib Mobilization with Strap (Mirrored) - 1 x daily - 7 x weekly - 3 sets - 20-30 seconds hold      Therapeutic Exercise and NMR EXR  [x] (31116) Provided verbal/tactile cueing for activities related to strengthening, flexibility, endurance, ROM  for improvements in cervical, postural, scapular, scapulothoracic and UE control with self care, reaching, carrying, lifting, house/yardwork, driving/computer work. [x] (40363) Provided verbal/tactile cueing for activities related to improving balance, coordination, kinesthetic sense, posture, motor skill, proprioception  to assist with cervical, scapular, scapulothoracic and UE control with self care, reaching, carrying, lifting, house/yardwork, driving/computer work.  [] (08080) Therapist is in constant attendance of 2 or more patients providing skilled therapy interventions, but not providing any significant amount of measurable one-on-one time to either patient, for improvements in cervical, scapular, scapulothoracic and UE control with self care, reaching, carrying, lifting, house/yardwork, driving, computer work. Therapeutic Activities:    [] (58799 or 85772) Provided verbal/tactile cueing for activities related to improving balance, coordination, kinesthetic sense, posture, motor skill, proprioception and motor activation to allow for proper function of cervical, scapular, scapulothoracic and UE control with self care, carrying, lifting, driving/computer work.      Home Exercise Program:    [x] (83965) Reviewed/Progressed HEP activities related to strengthening, flexibility, endurance, ROM of cervical, scapular, scapulothoracic and UE control with self care, reaching, carrying, lifting, house/yardwork, driving/computer work  [] (20676) Reviewed/Progressed HEP activities related to improving balance, coordination, kinesthetic sense, posture, motor skill, proprioception of cervical, scapular, scapulothoracic and UE control with self care, reaching, carrying, lifting, house/yardwork, driving/computer work      Manual Treatments:  PROM / STM / Oscillations-Mobs:  G-I, II, III, IV (PA's, Inf., Post.)  [x] (60393) Provided manual therapy to mobilize soft tissue/joints of cervical/CT, scapular GHJ and UE for the purpose of decreasing headache, modulating pain, promoting relaxation,  increasing ROM, reducing/eliminating soft tissue swelling/inflammation/restriction, improving soft tissue extensibility and allowing for proper ROM for normal function with self care, reaching, carrying, lifting, house/yardwork, driving/computer work    Charges:  Timed Code Treatment Minutes: 45'   Total Treatment Minutes: 45'        [] EVAL - LOW (69901)   [] EVAL - MOD (10117)  [] EVAL - HIGH (31926)  [] RE-EVAL (65010)  [x] GG(34576) x  2    [] Ionto  [x] NMR (91462) x       [] Vaso  [x] Manual (48294) x   1   [] Ultrasound  [] TA x        [] Mech Traction (96877)  [] Aquatic Therapy x     [] ES (un) (28007):   [] Home Management Training x  [] ES(attended) (25258)   [] Dry Needling 1-2 muscles (48780):  [] Dry Needling 3+ muscles (039640  [] Group:      [] Other:     GOALS:   Patient stated goal: Abolish pain and cramping sensation in neck/UE  [x] Progressing: [] Met: [] Not Met: [] Adjusted     Therapist goals for Patient:  Short Term Goals: To be achieved in: 2 weeks  1. Independent in HEP and progression per patient tolerance, in order to prevent re-injury. [] Progressing: [x] Met: [] Not Met: [] Adjusted  2.  Patient will have a decrease in pain to facilitate improvement in movement, function, and ADLs as indicated by Functional Deficits. [] Progressing: [x] Met: [] Not Met: [] Adjusted     Long Term Goals: To be achieved in: 12 weeks  1. FOTO score of at least 74 to assist with reaching prior level of function. [x] Progressing: [] Met: [] Not Met: [] Adjusted  2. Patient will demonstrate increased AROM to [facility] of cervical/thoracic spine to allow for proper joint functioning as indicated by patients Functional Deficits. [x] Progressing: [] Met: [] Not Met: [] Adjusted  3. Patient will demonstrate an increase in postural awareness and control and activation of  Deep cervical stabilizers to allow for proper functional mobility as indicated by patients Functional Deficits. [x] Progressing: [] Met: [] Not Met: [] Adjusted  4. Patient will return to functional activities including looking up with head without increased symptoms or restriction. [x] Progressing: [] Met: [] Not Met: [] Adjusted  5. Patient to be able to reach overhead and behind back without radicular pain (patient specific functional goal)    [x] Progressing: [] Met: [] Not Met: [] Adjusted          Overall Progression Towards Functional goals/ Treatment Progress Update:  [x] Patient is progressing as expected towards functional goals listed. [] Progression is slowed due to complexities/Impairments listed. [] Progression has been slowed due to co-morbidities. [] Plan just implemented, too soon to assess goals progression <30days   [] Goals require adjustment due to lack of progress  [] Patient is not progressing as expected and requires additional follow up with physician  [] Other    Persisting Functional Limitations/Impairments:  [x]Sitting []Standing   []Walking []Squatting/bending    []Stairs []ADL's    []Transfers []Reaching  [x]Housework [x]Lifting  []Driving []Job related tasks  []Sports/Recreation  []Sleeping  []Other:    ASSESSMENT: increased tightness noted with PA's C7-T1.   Improved following mobilization in prone. Still pain and limitations with R rotation so initiated SNAG and this did provide decreased pain following so this was added to HEP. Improved control with CC strengthening. Treatment/Activity Tolerance:  [x] Patient able to complete tx  [] Patient limited by fatique  [] Patient limited by pain   [] Patient limited by other medical complications  [] Other:     Prognosis: [x] Good [] Fair  [] Poor    Patient Requires Follow-up: [x] Yes  [] No    PLAN: See eval. PT 2x / week for 12 weeks. [x] Continue per plan of care [] Alter current plan (see comments)  [] Plan of care initiated [] Hold pending MD visit [] Discharge    Electronically signed by: Kaykay Varela, PT  OMT-C    Note: If patient does not return for scheduled/ recommended follow up visits, this note will serve as a discharge from care along with most recent update on progress.

## 2022-09-21 ENCOUNTER — HOSPITAL ENCOUNTER (OUTPATIENT)
Dept: PHYSICAL THERAPY | Age: 47
Setting detail: THERAPIES SERIES
Discharge: HOME | End: 2022-09-21
Payer: COMMERCIAL

## 2022-09-21 PROCEDURE — 97140 MANUAL THERAPY 1/> REGIONS: CPT | Performed by: PHYSICAL THERAPIST

## 2022-09-21 PROCEDURE — 97110 THERAPEUTIC EXERCISES: CPT | Performed by: PHYSICAL THERAPIST

## 2022-09-21 NOTE — FLOWSHEET NOTE
[address] [place] [address]  Phone: [phone]   Fax: [phone]    Physical Therapy Treatment Note/ Progress Report:     Date:  2022    Patient Name:  Benita Orourke    :  1975  MRN: [id number]  Restrictions/Precautions:    Medical/Treatment Diagnosis Information:  Diagnosis: M43,02 cervical spondylosis  Treatment Diagnosis: M43,02 cervical spondylosis, M54.2 Cervicalgia  Insurance/Certification information:  PT Insurance Information: Herlinda Suero Grad /60 visits/ No auth  Physician Information:  Audley Merlin MD    Plan of care signed (Y/N): []  Yes [x]  No     Date of Patient follow up with Physician: ?     Progress Report: []  Yes  [x]  No     Date Range for reporting period:  Beginnin22  Endin22    Progress report due (10 Rx/or 30 days whichever is less): visit #17 or /53 (date)     Recertification due (POC duration/ or 90 days whichever is less): visit # or 12 weeks 10/19/22 (date)     Visit # Insurance Allowable Auth required? Date Range   13 60 []  Yes  [x]  No 22-22       Units approved Units used Date Range          Latex Allergy:  [x]NO      []YES  Preferred Language for Healthcare:   [x]English       []other:    Functional Scale:           Date assessed:  FOTO physical FS primary measure score 60/100; risk adjusted 60/100 8/10/22  FOTO 61          22    Pain level:  1-2/10 pre-session, 0/10 post session    SUBJECTIVE:  Pt reports feeling better today, less stiff/sore. OBJECTIVE:    Observation:   Test measurements:       RESTRICTIONS/PRECAUTIONS: HTN, lumbar disc disorder, NEEDS INTERPRETATION SERVICES for [place].     Exercises/Interventions:   Therapeutic Exercise (13834) Sets/Reps Notes   Supine chin tucks on 1 pillow   Seated chin tucks  HEP, less form cues needed  Form cues   No money   HAB HEP  HEP   UT stretches  w/ strap 30\"x3 reps ea R/L HEP   TB row  TB shoulder ext  TB PNF D2 flexion HEP, gave Blue TB 8/22/22  VTC's   Wall  B arm pec S 3x30\" B HEP   SB semi prone @ EOB w/ shoulder ext Form/head cues   Seated I, T 5# 2x10 ea    Table push up SA control 2x10 Good control   TB SA wall slide HEP   SNAG R rotation with pillowcase x10 HEP       MB reaches chest height to top of door frame UT cues B w/ fatigue   CC row  Bicep curl  Shoulder ext 6 pl 2x20  6 pl 2x20  6 pl 2x20     hold  Overhead press 10# 2x30\" R/L  10# x10 R/L    Self thoracic ext over chair w/ head/neck supported    Seated chin tuck w/ TB Heavy VTC's, difficulty w/ 1proper form, D/C   Prone over SB HAB, B shoulder ext 3# x20 ea Scap cues initially   Body blade IR/ER, low abd 2x20\" ea R/L              Therapeutic Activities (08548)                                   NMR re-education (17747)     Patient education with, HEP, POC, posture,                       Manual Intervention (27868)     Cerv mobs L lat glides x1'    Thoracic mobs/manip     CT manip     Rib mobilizations     STM R>L UT/scalenes/PSM, , TPR R UT x8'    Manual traction in supine 1 pillow X4' intermittent pulls Responded well. Prone upper thoracic PA's, mid thoracic rotation mobs, C5-7 PA's all gr lll x5' Cavitation ~T4 9/12/22            Modalities: declined    Patient education:  -pt educated on diagnosis, prognosis and expectations for rehab  -all pt questions were answered    Home Exercise Program:  HEP instruction: Written HEP instructions provided and reviewed. Access Code: [email or web address]  URL: [email or web address]  Date: 09/19/2022  Prepared by: Burak Melissa    Exercises  Supine Chin Tuck - 2 x daily - 7 x weekly - 2 sets - 10 reps - 5 hold  Shoulder External Rotation and Scapular Retraction with Resistance - 2 x daily - 7 x weekly - 2 sets - 10 reps - 5 hold  Seated Shoulder Horizontal Abduction with Resistance - 1 x daily - 7 x weekly - 2 sets - 15 reps  Seated Upper Trapezius Stretch - 2 x daily - 7 x weekly - 1-2 sets - 5 reps - 30 hold  Single Arm Doorway Pec Stretch at 90 Degrees Abduction - 1 x daily - 7 x weekly - 3 sets - 30 seconds hold  Standing Shoulder Row with Anchored Resistance - 1 x daily - 7 x weekly - 3 sets - 10 reps  Shoulder Flexion Wall Slide with Resistance Band - 1 x daily - 7 x weekly - 2 sets - 15 reps  Seated Assisted Cervical Rotation with Towel (Mirrored) - 1 x daily - 7 x weekly - 1-2 sets - 10 reps  First Rib Mobilization with Strap (Mirrored) - 1 x daily - 7 x weekly - 3 sets - 20-30 seconds hold      Therapeutic Exercise and NMR EXR  [x] (52541) Provided verbal/tactile cueing for activities related to strengthening, flexibility, endurance, ROM  for improvements in cervical, postural, scapular, scapulothoracic and UE control with self care, reaching, carrying, lifting, house/yardwork, driving/computer work. [x] (13382) Provided verbal/tactile cueing for activities related to improving balance, coordination, kinesthetic sense, posture, motor skill, proprioception  to assist with cervical, scapular, scapulothoracic and UE control with self care, reaching, carrying, lifting, house/yardwork, driving/computer work.  [] (35709) Therapist is in constant attendance of 2 or more patients providing skilled therapy interventions, but not providing any significant amount of measurable one-on-one time to either patient, for improvements in cervical, scapular, scapulothoracic and UE control with self care, reaching, carrying, lifting, house/yardwork, driving, computer work. Therapeutic Activities:    [] (23461 or 26316) Provided verbal/tactile cueing for activities related to improving balance, coordination, kinesthetic sense, posture, motor skill, proprioception and motor activation to allow for proper function of cervical, scapular, scapulothoracic and UE control with self care, carrying, lifting, driving/computer work.      Home Exercise Program:    [x] (99948) Reviewed/Progressed HEP activities related to strengthening, flexibility, endurance, ROM of cervical, scapular, scapulothoracic and UE control with self care, reaching, carrying, lifting, house/yardwork, driving/computer work  [] (25261) Reviewed/Progressed HEP activities related to improving balance, coordination, kinesthetic sense, posture, motor skill, proprioception of cervical, scapular, scapulothoracic and UE control with self care, reaching, carrying, lifting, house/yardwork, driving/computer work      Manual Treatments:  PROM / STM / Oscillations-Mobs:  G-I, II, III, IV (PA's, Inf., Post.)  [x] (32428) Provided manual therapy to mobilize soft tissue/joints of cervical/CT, scapular GHJ and UE for the purpose of decreasing headache, modulating pain, promoting relaxation,  increasing ROM, reducing/eliminating soft tissue swelling/inflammation/restriction, improving soft tissue extensibility and allowing for proper ROM for normal function with self care, reaching, carrying, lifting, house/yardwork, driving/computer work    Charges:  Timed Code Treatment Minutes: 45'   Total Treatment Minutes: 45'        [] EVAL - LOW (52953)   [] EVAL - MOD (71402)  [] EVAL - HIGH (01577)  [] RE-EVAL (90665)  [x] MZ(44387) x  2    [] Ionto  [x] NMR (03101) x       [] Vaso  [x] Manual ([id number]) x   1   [] Ultrasound  [] TA x        [] Mech Traction (97144)  [] Aquatic Therapy x     [] ES (un) (80959):   [] Home Management Training x  [] ES(attended) (25170)   [] Dry Needling 1-2 muscles (39439):  [] Dry Needling 3+ muscles (977672  [] Group:      [] Other:     GOALS:   Patient stated goal: Abolish pain and cramping sensation in neck/UE  [x] Progressing: [] Met: [] Not Met: [] Adjusted     Therapist goals for Patient:  Short Term Goals: To be achieved in: 2 weeks  1. Independent in HEP and progression per patient tolerance, in order to prevent re-injury. [] Progressing: [x] Met: [] Not Met: [] Adjusted  2.  Patient will have a decrease in pain to facilitate improvement in movement, function, and ADLs as indicated by Functional Deficits. [] Progressing: [x] Met: [] Not Met: [] Adjusted     Long Term Goals: To be achieved in: 12 weeks  1. FOTO score of at least 74 to assist with reaching prior level of function. [x] Progressing: [] Met: [] Not Met: [] Adjusted  2. Patient will demonstrate increased AROM to [facility] of cervical/thoracic spine to allow for proper joint functioning as indicated by patients Functional Deficits. [x] Progressing: [] Met: [] Not Met: [] Adjusted  3. Patient will demonstrate an increase in postural awareness and control and activation of  Deep cervical stabilizers to allow for proper functional mobility as indicated by patients Functional Deficits. [x] Progressing: [] Met: [] Not Met: [] Adjusted  4. Patient will return to functional activities including looking up with head without increased symptoms or restriction. [x] Progressing: [] Met: [] Not Met: [] Adjusted  5. Patient to be able to reach overhead and behind back without radicular pain (patient specific functional goal)    [x] Progressing: [] Met: [] Not Met: [] Adjusted          Overall Progression Towards Functional goals/ Treatment Progress Update:  [x] Patient is progressing as expected towards functional goals listed. [] Progression is slowed due to complexities/Impairments listed. [] Progression has been slowed due to co-morbidities. [] Plan just implemented, too soon to assess goals progression <30days   [] Goals require adjustment due to lack of progress  [] Patient is not progressing as expected and requires additional follow up with physician  [] Other    Persisting Functional Limitations/Impairments:  [x]Sitting []Standing   []Walking []Squatting/bending    []Stairs []ADL's    []Transfers []Reaching  [x]Housework [x]Lifting  []Driving []Job related tasks  []Sports/Recreation  []Sleeping  []Other:    ASSESSMENT: cavitation approx T4-5 w/ prone PA's and improved general joint mobility with mobs this visit.   Able to

## 2022-09-27 ENCOUNTER — HOSPITAL ENCOUNTER (OUTPATIENT)
Dept: PHYSICAL THERAPY | Age: 47
Setting detail: THERAPIES SERIES
Discharge: HOME | End: 2022-09-27
Payer: COMMERCIAL

## 2022-09-27 NOTE — FLOWSHEET NOTE
[address] [facility]      Physical Therapy  Cancellation/No-show Note  Patient Name:  Natividad Quezada  :  1975   Date:  2022  Cancelled visits to date: 1  No-shows to date: 1    For today's appointment patient:  []  Cancelled  []  Rescheduled appointment  [x]  No-show     Reason given by patient:  []  Patient ill  []  Conflicting appointment  []  No transportation    []  Conflict with work  [x]  No reason given  []  Other:     Comments:      Electronically signed by:  Mary Joyce PT